# Patient Record
Sex: FEMALE | Race: WHITE | NOT HISPANIC OR LATINO | Employment: UNEMPLOYED
[De-identification: names, ages, dates, MRNs, and addresses within clinical notes are randomized per-mention and may not be internally consistent; named-entity substitution may affect disease eponyms.]

---

## 2017-05-26 ENCOUNTER — HEALTH MAINTENANCE LETTER (OUTPATIENT)
Age: 57
End: 2017-05-26

## 2018-09-22 ENCOUNTER — HEALTH MAINTENANCE LETTER (OUTPATIENT)
Age: 58
End: 2018-09-22

## 2019-07-28 ENCOUNTER — HOSPITAL ENCOUNTER (INPATIENT)
Facility: CLINIC | Age: 59
LOS: 2 days | Discharge: HOME | End: 2019-07-30
Attending: FAMILY MEDICINE | Admitting: FAMILY MEDICINE
Payer: COMMERCIAL

## 2019-07-28 DIAGNOSIS — E87.6 HYPOPOTASSEMIA: Primary | ICD-10-CM

## 2019-07-28 DIAGNOSIS — I10 ESSENTIAL HYPERTENSION: ICD-10-CM

## 2019-07-28 DIAGNOSIS — M62.81 GENERALIZED MUSCLE WEAKNESS: ICD-10-CM

## 2019-07-28 DIAGNOSIS — E86.0 DEHYDRATION: ICD-10-CM

## 2019-07-28 DIAGNOSIS — F17.210 CIGARETTE SMOKER: ICD-10-CM

## 2019-07-28 DIAGNOSIS — F17.200 TOBACCO USE DISORDER: ICD-10-CM

## 2019-07-28 DIAGNOSIS — R50.9 FEVER AND CHILLS: ICD-10-CM

## 2019-07-28 DIAGNOSIS — A41.9 SEPSIS, DUE TO UNSPECIFIED ORGANISM: ICD-10-CM

## 2019-07-28 DIAGNOSIS — N10 ACUTE PYELONEPHRITIS: ICD-10-CM

## 2019-07-28 DIAGNOSIS — G40.909 SEIZURE DISORDER (H): ICD-10-CM

## 2019-07-28 LAB
ALBUMIN SERPL-MCNC: 3.1 G/DL (ref 3.4–5)
ALBUMIN UR-MCNC: NEGATIVE MG/DL
ALP SERPL-CCNC: 47 U/L (ref 40–150)
ALT SERPL W P-5'-P-CCNC: 11 U/L (ref 0–50)
ANION GAP SERPL CALCULATED.3IONS-SCNC: 10 MMOL/L (ref 3–14)
APPEARANCE UR: ABNORMAL
APTT PPP: 34 SEC (ref 22–37)
AST SERPL W P-5'-P-CCNC: 15 U/L (ref 0–45)
BACTERIA #/AREA URNS HPF: ABNORMAL /HPF
BASOPHILS # BLD AUTO: 0 10E9/L (ref 0–0.2)
BASOPHILS NFR BLD AUTO: 0.1 %
BILIRUB SERPL-MCNC: 0.7 MG/DL (ref 0.2–1.3)
BILIRUB UR QL STRIP: NEGATIVE
BUN SERPL-MCNC: 12 MG/DL (ref 7–30)
CALCIUM SERPL-MCNC: 9.1 MG/DL (ref 8.5–10.1)
CHLORIDE SERPL-SCNC: 97 MMOL/L (ref 94–109)
CO2 SERPL-SCNC: 26 MMOL/L (ref 20–32)
COLOR UR AUTO: COLORLESS
CREAT SERPL-MCNC: 0.67 MG/DL (ref 0.52–1.04)
DIFFERENTIAL METHOD BLD: ABNORMAL
EOSINOPHIL NFR BLD AUTO: 0 %
ERYTHROCYTE [DISTWIDTH] IN BLOOD BY AUTOMATED COUNT: 16.5 % (ref 10–15)
GFR SERPL CREATININE-BSD FRML MDRD: >90 ML/MIN/{1.73_M2}
GLUCOSE SERPL-MCNC: 105 MG/DL (ref 70–99)
GLUCOSE UR STRIP-MCNC: NEGATIVE MG/DL
HCT VFR BLD AUTO: 40.4 % (ref 35–47)
HGB BLD-MCNC: 13.6 G/DL (ref 11.7–15.7)
HGB UR QL STRIP: ABNORMAL
IMM GRANULOCYTES # BLD: 0.1 10E9/L (ref 0–0.4)
IMM GRANULOCYTES NFR BLD: 0.4 %
INR PPP: 1.2 (ref 0.86–1.14)
KETONES UR STRIP-MCNC: NEGATIVE MG/DL
LACTATE BLD-SCNC: 1.6 MMOL/L (ref 0.7–2)
LEUKOCYTE ESTERASE UR QL STRIP: ABNORMAL
LYMPHOCYTES # BLD AUTO: 0.9 10E9/L (ref 0.8–5.3)
LYMPHOCYTES NFR BLD AUTO: 7.3 %
MCH RBC QN AUTO: 31 PG (ref 26.5–33)
MCHC RBC AUTO-ENTMCNC: 33.7 G/DL (ref 31.5–36.5)
MCV RBC AUTO: 92 FL (ref 78–100)
MONOCYTES # BLD AUTO: 1 10E9/L (ref 0–1.3)
MONOCYTES NFR BLD AUTO: 8.1 %
MUCOUS THREADS #/AREA URNS LPF: PRESENT /LPF
NEUTROPHILS # BLD AUTO: 10.1 10E9/L (ref 1.6–8.3)
NEUTROPHILS NFR BLD AUTO: 84.1 %
NITRATE UR QL: NEGATIVE
NRBC # BLD AUTO: 0 10*3/UL
NRBC BLD AUTO-RTO: 0 /100
PH UR STRIP: 5 PH (ref 5–7)
PLATELET # BLD AUTO: 166 10E9/L (ref 150–450)
POTASSIUM SERPL-SCNC: 3.6 MMOL/L (ref 3.4–5.3)
PROT SERPL-MCNC: 6.7 G/DL (ref 6.8–8.8)
RBC # BLD AUTO: 4.39 10E12/L (ref 3.8–5.2)
RBC #/AREA URNS AUTO: 5 /HPF (ref 0–2)
SODIUM SERPL-SCNC: 133 MMOL/L (ref 133–144)
SOURCE: ABNORMAL
SP GR UR STRIP: 1 (ref 1–1.03)
SQUAMOUS #/AREA URNS AUTO: <1 /HPF (ref 0–1)
UROBILINOGEN UR STRIP-MCNC: 0 MG/DL (ref 0–2)
WBC # BLD AUTO: 12 10E9/L (ref 4–11)
WBC #/AREA URNS AUTO: 125 /HPF (ref 0–5)

## 2019-07-28 PROCEDURE — 96365 THER/PROPH/DIAG IV INF INIT: CPT | Performed by: FAMILY MEDICINE

## 2019-07-28 PROCEDURE — 85730 THROMBOPLASTIN TIME PARTIAL: CPT | Performed by: FAMILY MEDICINE

## 2019-07-28 PROCEDURE — 87081 CULTURE SCREEN ONLY: CPT | Performed by: FAMILY MEDICINE

## 2019-07-28 PROCEDURE — 99221 1ST HOSP IP/OBS SF/LOW 40: CPT | Mod: AI | Performed by: NURSE PRACTITIONER

## 2019-07-28 PROCEDURE — 87186 SC STD MICRODIL/AGAR DIL: CPT | Performed by: FAMILY MEDICINE

## 2019-07-28 PROCEDURE — 99285 EMERGENCY DEPT VISIT HI MDM: CPT | Mod: Z6 | Performed by: FAMILY MEDICINE

## 2019-07-28 PROCEDURE — 87040 BLOOD CULTURE FOR BACTERIA: CPT | Performed by: NURSE PRACTITIONER

## 2019-07-28 PROCEDURE — 25000132 ZZH RX MED GY IP 250 OP 250 PS 637: Performed by: NURSE PRACTITIONER

## 2019-07-28 PROCEDURE — 99207 ZZC DOWN CODE DUE TO INITIAL EXAM: CPT | Performed by: NURSE PRACTITIONER

## 2019-07-28 PROCEDURE — 25800030 ZZH RX IP 258 OP 636: Performed by: NURSE PRACTITIONER

## 2019-07-28 PROCEDURE — 87077 CULTURE AEROBIC IDENTIFY: CPT | Performed by: FAMILY MEDICINE

## 2019-07-28 PROCEDURE — 85610 PROTHROMBIN TIME: CPT | Performed by: FAMILY MEDICINE

## 2019-07-28 PROCEDURE — 87040 BLOOD CULTURE FOR BACTERIA: CPT | Performed by: FAMILY MEDICINE

## 2019-07-28 PROCEDURE — 80053 COMPREHEN METABOLIC PANEL: CPT | Performed by: FAMILY MEDICINE

## 2019-07-28 PROCEDURE — 25000128 H RX IP 250 OP 636: Performed by: FAMILY MEDICINE

## 2019-07-28 PROCEDURE — 12000000 ZZH R&B MED SURG/OB

## 2019-07-28 PROCEDURE — 25800030 ZZH RX IP 258 OP 636: Performed by: FAMILY MEDICINE

## 2019-07-28 PROCEDURE — 87800 DETECT AGNT MULT DNA DIREC: CPT | Performed by: FAMILY MEDICINE

## 2019-07-28 PROCEDURE — 96361 HYDRATE IV INFUSION ADD-ON: CPT | Performed by: FAMILY MEDICINE

## 2019-07-28 PROCEDURE — 85025 COMPLETE CBC W/AUTO DIFF WBC: CPT | Performed by: FAMILY MEDICINE

## 2019-07-28 PROCEDURE — 99285 EMERGENCY DEPT VISIT HI MDM: CPT | Mod: 25 | Performed by: FAMILY MEDICINE

## 2019-07-28 PROCEDURE — 36415 COLL VENOUS BLD VENIPUNCTURE: CPT | Performed by: NURSE PRACTITIONER

## 2019-07-28 PROCEDURE — 81001 URINALYSIS AUTO W/SCOPE: CPT | Performed by: FAMILY MEDICINE

## 2019-07-28 PROCEDURE — 87086 URINE CULTURE/COLONY COUNT: CPT | Performed by: FAMILY MEDICINE

## 2019-07-28 PROCEDURE — 87088 URINE BACTERIA CULTURE: CPT | Performed by: FAMILY MEDICINE

## 2019-07-28 PROCEDURE — 25000132 ZZH RX MED GY IP 250 OP 250 PS 637: Performed by: FAMILY MEDICINE

## 2019-07-28 PROCEDURE — 83605 ASSAY OF LACTIC ACID: CPT | Performed by: FAMILY MEDICINE

## 2019-07-28 RX ORDER — AMOXICILLIN 250 MG
1 CAPSULE ORAL 2 TIMES DAILY PRN
Status: DISCONTINUED | OUTPATIENT
Start: 2019-07-28 | End: 2019-07-30 | Stop reason: HOSPADM

## 2019-07-28 RX ORDER — AMOXICILLIN 250 MG
2 CAPSULE ORAL 2 TIMES DAILY PRN
Status: DISCONTINUED | OUTPATIENT
Start: 2019-07-28 | End: 2019-07-30 | Stop reason: HOSPADM

## 2019-07-28 RX ORDER — LIDOCAINE 40 MG/G
CREAM TOPICAL
Status: DISCONTINUED | OUTPATIENT
Start: 2019-07-28 | End: 2019-07-28

## 2019-07-28 RX ORDER — PROCHLORPERAZINE 25 MG
25 SUPPOSITORY, RECTAL RECTAL EVERY 12 HOURS PRN
Status: DISCONTINUED | OUTPATIENT
Start: 2019-07-28 | End: 2019-07-30 | Stop reason: HOSPADM

## 2019-07-28 RX ORDER — CEFTRIAXONE 1 G/1
1 INJECTION, POWDER, FOR SOLUTION INTRAMUSCULAR; INTRAVENOUS EVERY 24 HOURS
Status: DISCONTINUED | OUTPATIENT
Start: 2019-07-28 | End: 2019-07-30

## 2019-07-28 RX ORDER — NALOXONE HYDROCHLORIDE 0.4 MG/ML
.1-.4 INJECTION, SOLUTION INTRAMUSCULAR; INTRAVENOUS; SUBCUTANEOUS
Status: DISCONTINUED | OUTPATIENT
Start: 2019-07-28 | End: 2019-07-30 | Stop reason: HOSPADM

## 2019-07-28 RX ORDER — ONDANSETRON 2 MG/ML
4 INJECTION INTRAMUSCULAR; INTRAVENOUS EVERY 6 HOURS PRN
Status: DISCONTINUED | OUTPATIENT
Start: 2019-07-28 | End: 2019-07-30 | Stop reason: HOSPADM

## 2019-07-28 RX ORDER — ACETAMINOPHEN 325 MG/1
650 TABLET ORAL ONCE
Status: COMPLETED | OUTPATIENT
Start: 2019-07-28 | End: 2019-07-28

## 2019-07-28 RX ORDER — LIDOCAINE 40 MG/G
CREAM TOPICAL
Status: DISCONTINUED | OUTPATIENT
Start: 2019-07-28 | End: 2019-07-30 | Stop reason: HOSPADM

## 2019-07-28 RX ORDER — SODIUM CHLORIDE 9 MG/ML
INJECTION, SOLUTION INTRAVENOUS CONTINUOUS
Status: DISCONTINUED | OUTPATIENT
Start: 2019-07-28 | End: 2019-07-30

## 2019-07-28 RX ORDER — PROCHLORPERAZINE MALEATE 5 MG
10 TABLET ORAL EVERY 6 HOURS PRN
Status: DISCONTINUED | OUTPATIENT
Start: 2019-07-28 | End: 2019-07-30 | Stop reason: HOSPADM

## 2019-07-28 RX ORDER — ACETAMINOPHEN 325 MG/1
650 TABLET ORAL EVERY 4 HOURS PRN
Status: DISCONTINUED | OUTPATIENT
Start: 2019-07-28 | End: 2019-07-30 | Stop reason: HOSPADM

## 2019-07-28 RX ORDER — HYDROMORPHONE HYDROCHLORIDE 1 MG/ML
.2-.3 INJECTION, SOLUTION INTRAMUSCULAR; INTRAVENOUS; SUBCUTANEOUS
Status: DISCONTINUED | OUTPATIENT
Start: 2019-07-28 | End: 2019-07-30 | Stop reason: HOSPADM

## 2019-07-28 RX ORDER — HYDROCODONE BITARTRATE AND ACETAMINOPHEN 5; 325 MG/1; MG/1
1-2 TABLET ORAL EVERY 4 HOURS PRN
Status: DISCONTINUED | OUTPATIENT
Start: 2019-07-28 | End: 2019-07-30 | Stop reason: HOSPADM

## 2019-07-28 RX ORDER — DIVALPROEX SODIUM 250 MG/1
500 TABLET, EXTENDED RELEASE ORAL 2 TIMES DAILY
Status: DISCONTINUED | OUTPATIENT
Start: 2019-07-28 | End: 2019-07-30 | Stop reason: HOSPADM

## 2019-07-28 RX ORDER — ONDANSETRON 4 MG/1
4 TABLET, ORALLY DISINTEGRATING ORAL EVERY 6 HOURS PRN
Status: DISCONTINUED | OUTPATIENT
Start: 2019-07-28 | End: 2019-07-30 | Stop reason: HOSPADM

## 2019-07-28 RX ADMIN — ACETAMINOPHEN 650 MG: 325 TABLET ORAL at 13:55

## 2019-07-28 RX ADMIN — SODIUM CHLORIDE: 9 INJECTION, SOLUTION INTRAVENOUS at 23:07

## 2019-07-28 RX ADMIN — CEFTRIAXONE SODIUM 1 G: 1 INJECTION, POWDER, FOR SOLUTION INTRAMUSCULAR; INTRAVENOUS at 14:45

## 2019-07-28 RX ADMIN — DIVALPROEX SODIUM 500 MG: 250 TABLET, FILM COATED, EXTENDED RELEASE ORAL at 21:10

## 2019-07-28 RX ADMIN — HYDROCODONE BITARTRATE AND ACETAMINOPHEN 2 TABLET: 5; 325 TABLET ORAL at 19:23

## 2019-07-28 RX ADMIN — SODIUM CHLORIDE 1000 ML: 9 INJECTION, SOLUTION INTRAVENOUS at 12:58

## 2019-07-28 RX ADMIN — SODIUM CHLORIDE: 9 INJECTION, SOLUTION INTRAVENOUS at 14:46

## 2019-07-28 ASSESSMENT — ENCOUNTER SYMPTOMS
NAUSEA: 1
FEVER: 1
ABDOMINAL PAIN: 0
COUGH: 0
CHEST TIGHTNESS: 0
POLYDIPSIA: 0
BACK PAIN: 1
HEADACHES: 0
WEAKNESS: 1
LIGHT-HEADEDNESS: 1
PALPITATIONS: 0
DIARRHEA: 0
SORE THROAT: 0
EYE REDNESS: 0
FATIGUE: 1
SHORTNESS OF BREATH: 0
CHILLS: 1
DIZZINESS: 1
VOMITING: 0
CONFUSION: 0
NERVOUS/ANXIOUS: 0
FREQUENCY: 1
DYSURIA: 1

## 2019-07-28 ASSESSMENT — ACTIVITIES OF DAILY LIVING (ADL)
NUMBER_OF_TIMES_PATIENT_HAS_FALLEN_WITHIN_LAST_SIX_MONTHS: 1
SWALLOWING: 2-->DIFFICULTY SWALLOWING LIQUIDS
BATHING: 1-->ASSISTIVE EQUIPMENT
TOILETING: 1-->ASSISTIVE EQUIPMENT
FALL_HISTORY_WITHIN_LAST_SIX_MONTHS: YES
WHICH_OF_THE_ABOVE_FUNCTIONAL_RISKS_HAD_A_RECENT_ONSET_OR_CHANGE?: AMBULATION
ADLS_ACUITY_SCORE: 22
TRANSFERRING: 0-->INDEPENDENT
AMBULATION: 0-->INDEPENDENT
RETIRED_EATING: 1-->ASSISTIVE EQUIPMENT
COGNITION: 0 - NO COGNITION ISSUES REPORTED
DRESS: 1-->ASSISTIVE EQUIPMENT
RETIRED_COMMUNICATION: 2-->DIFFICULTY UNDERSTANDING (NOT RELATED TO LANGUAGE BARRIER)

## 2019-07-28 ASSESSMENT — MIFFLIN-ST. JEOR: SCORE: 957.75

## 2019-07-28 NOTE — ED NOTES
Straight cath'd for small amt of urine after incont.  Pt cont to chill.  Temp re-ck'd  Elevated 102.2 oral.  MD notified.

## 2019-07-28 NOTE — ED PROVIDER NOTES
History     Chief Complaint   Patient presents with     Flank Pain     HPI  Jemima Yuen is a 58 year old female with history of seizure disorder, tobacco abuse and hypertension who presents to the emergency room with severe symptoms of illness of fever chills weakness and back pain.  Patient states she is only here at the insistence of her sister who forced her to come in.  Patient does not like to see doctors or seek medical care.  She has had frequency and dysuria for 2 to 3 weeks.  Symptoms have been increasing.  The last several days she has been complaining of flank pain.  She has had nausea without vomiting.  She denies headache or sore throat or any cold symptoms.  No chest pain cough or congestion.  No abdominal pain vomiting or diarrhea.  No rash.  No history of tick bites.  Has a history of UTIs but is never had a kidney infection or sepsis.  Today she was feeling very feverish and weak.    Allergies:  Allergies   Allergen Reactions     Codeine Itching     Percocet [Oxycodone-Acetaminophen] GI Disturbance     Vicodin [Hydrocodone-Acetaminophen] GI Disturbance       Problem List:    Patient Active Problem List    Diagnosis Date Noted     Mammogram declined 12/03/2014     Priority: Medium     Pap smear of cervix declined 12/03/2014     Priority: Medium     Tobacco use disorder 12/03/2014     Priority: Medium     Ovarian cyst 03/15/2011     Priority: Medium     Convulsions (H)      Priority: Medium     Epilepsy  Problem list name updated by automated process. Provider to review       CARDIOVASCULAR SCREENING; LDL GOAL LESS THAN 160 10/31/2010     Priority: Medium     Hypertension 03/17/2009     Priority: Medium        Past Medical History:    Past Medical History:   Diagnosis Date     Excessive or frequent menstruation 2/01     Headache(784.0)      Hypertension 3/17/2009     Other convulsions      Other unspecified back disorder        Past Surgical History:    Past Surgical History:   Procedure  Laterality Date     C LIGATE FALLOPIAN TUBE,POSTPARTUM  1981    Tubal Ligation     COLONOSCOPY  2011    Procedure:COMBINED COLONOSCOPY, SINGLE BIOPSY/POLYPECTOMY BY BIOPSY; Surgeon:PARRISH PINON; Location:[facility]     HC KNEE SCOPE, DIAGNOSTIC  10/95,     Arthroscopy,  Right Knee x 2     HC REPAIR INTERCARP/CARP-METACARP JT  08    left wrist       Family History:    Family History   Problem Relation Age of Onset     Neurologic Disorder Mother         epilepsy       Social History:  Marital Status:   [2]  Social History     Tobacco Use     Smoking status: Current Every Day Smoker     Packs/day: 0.25     Last attempt to quit: 2011     Years since quittin.4     Smokeless tobacco: Never Used     Tobacco comment: no smokers in household   Substance Use Topics     Alcohol use: Yes     Comment: dennis x 1/<1x per yr     Drug use: No     Comment: Marijuana in past/nothing since         Medications:      cyclobenzaprine (FLEXERIL) 10 MG tablet   DEPAKOTE  MG OR TB24   MULTIPLE VITAMIN OR TABS         Review of Systems   Constitutional: Positive for chills, fatigue and fever.   HENT: Negative for congestion and sore throat.    Eyes: Negative for redness.   Respiratory: Negative for cough, chest tightness and shortness of breath.    Cardiovascular: Negative for chest pain and palpitations.   Gastrointestinal: Positive for nausea. Negative for abdominal pain, diarrhea and vomiting.   Endocrine: Negative for polydipsia.   Genitourinary: Positive for dysuria, frequency and urgency.   Musculoskeletal: Positive for back pain.   Skin: Negative for rash.   Allergic/Immunologic: Negative for immunocompromised state.   Neurological: Positive for dizziness, weakness and light-headedness. Negative for headaches.   Psychiatric/Behavioral: Negative for confusion. The patient is not nervous/anxious.    All other systems reviewed and are negative.      Physical Exam   BP: 154/89  Pulse: 124  Heart  Rate: 141  Temp: 98.1  F (36.7  C)  Resp: 28  SpO2: 95 %      Physical Exam   Constitutional: She is oriented to person, place, and time. She appears well-developed and well-nourished.   Thin weak frail 58-year-old female who appears older than stated age.  She appears ill-febrile tachycardic.  She is breathing at ease and has a normal O2 sat on room air./90   Pulse 124   Temp 102.8  F (39.3  C) (Oral)   Resp (!) 35   LMP 12/07/2010   SpO2 94%      HENT:   Head: Normocephalic and atraumatic.   Right Ear: External ear normal.   Left Ear: External ear normal.   Nose: Nose normal.   Mouth/Throat: Oropharynx is clear and moist.   Eyes: Pupils are equal, round, and reactive to light. Conjunctivae and EOM are normal.   Neck: Normal range of motion. Neck supple.   Cardiovascular: Normal rate, regular rhythm, normal heart sounds and intact distal pulses.   Pulmonary/Chest: Effort normal and breath sounds normal.   Abdominal: Soft. Bowel sounds are normal.   Musculoskeletal: Normal range of motion.   Neurological: She is alert and oriented to person, place, and time.   Skin: Skin is warm and dry. Capillary refill takes less than 2 seconds.   Psychiatric: She has a normal mood and affect. Her behavior is normal.   Nursing note and vitals reviewed.      ED Course        Procedures               Critical Care time:  none               Results for orders placed or performed during the hospital encounter of 07/28/19 (from the past 24 hour(s))   Comprehensive metabolic panel   Result Value Ref Range    Sodium 133 133 - 144 mmol/L    Potassium 3.6 3.4 - 5.3 mmol/L    Chloride 97 94 - 109 mmol/L    Carbon Dioxide 26 20 - 32 mmol/L    Anion Gap 10 3 - 14 mmol/L    Glucose 105 (H) 70 - 99 mg/dL    Urea Nitrogen 12 7 - 30 mg/dL    Creatinine 0.67 0.52 - 1.04 mg/dL    GFR Estimate >90 >60 mL/min/[1.73_m2]    GFR Estimate If Black >90 >60 mL/min/[1.73_m2]    Calcium 9.1 8.5 - 10.1 mg/dL    Bilirubin Total 0.7 0.2 - 1.3 mg/dL     Albumin 3.1 (L) 3.4 - 5.0 g/dL    Protein Total 6.7 (L) 6.8 - 8.8 g/dL    Alkaline Phosphatase 47 40 - 150 U/L    ALT 11 0 - 50 U/L    AST 15 0 - 45 U/L   CBC with platelets differential   Result Value Ref Range    WBC 12.0 (H) 4.0 - 11.0 10e9/L    RBC Count 4.39 3.8 - 5.2 10e12/L    Hemoglobin 13.6 11.7 - 15.7 g/dL    Hematocrit 40.4 35.0 - 47.0 %    MCV 92 78 - 100 fl    MCH 31.0 26.5 - 33.0 pg    MCHC 33.7 31.5 - 36.5 g/dL    RDW 16.5 (H) 10.0 - 15.0 %    Platelet Count 166 150 - 450 10e9/L    Diff Method Automated Method     % Neutrophils 84.1 %    % Lymphocytes 7.3 %    % Monocytes 8.1 %    % Eosinophils 0.0 %    % Basophils 0.1 %    % Immature Granulocytes 0.4 %    Nucleated RBCs 0 0 /100    Absolute Neutrophil 10.1 (H) 1.6 - 8.3 10e9/L    Absolute Lymphocytes 0.9 0.8 - 5.3 10e9/L    Absolute Monocytes 1.0 0.0 - 1.3 10e9/L    Absolute Basophils 0.0 0.0 - 0.2 10e9/L    Abs Immature Granulocytes 0.1 0 - 0.4 10e9/L    Absolute Nucleated RBC 0.0    Lactate for Sepsis Protocol   Result Value Ref Range    Lactate for Sepsis Protocol 1.6 0.7 - 2.0 mmol/L   UA with Microscopic   Result Value Ref Range    Color Urine Colorless     Appearance Urine Slightly Cloudy     Glucose Urine Negative NEG^Negative mg/dL    Bilirubin Urine Negative NEG^Negative    Ketones Urine Negative NEG^Negative mg/dL    Specific Gravity Urine 1.005 1.003 - 1.035    Blood Urine Moderate (A) NEG^Negative    pH Urine 5.0 5.0 - 7.0 pH    Protein Albumin Urine Negative NEG^Negative mg/dL    Urobilinogen mg/dL 0.0 0.0 - 2.0 mg/dL    Nitrite Urine Negative NEG^Negative    Leukocyte Esterase Urine Large (A) NEG^Negative    Source Catheterized Urine     WBC Urine 125 (H) 0 - 5 /HPF    RBC Urine 5 (H) 0 - 2 /HPF    Bacteria Urine Moderate (A) NEG^Negative /HPF    Squamous Epithelial /HPF Urine <1 0 - 1 /HPF    Mucous Urine Present (A) NEG^Negative /LPF   Blood culture   Result Value Ref Range    Specimen Description Blood     Culture Micro No growth  after 1 hour    Blood culture   Result Value Ref Range    Specimen Description Blood     Culture Micro No growth after 1 hour    INR   Result Value Ref Range    INR 1.20 (H) 0.86 - 1.14   Partial thromboplastin time   Result Value Ref Range    PTT 34 22 - 37 sec       Medications   lidocaine 1 % 1 mL (has no administration in time range)   lidocaine (LMX4) kit (has no administration in time range)   sodium chloride (PF) 0.9% PF flush 3 mL (has no administration in time range)   sodium chloride (PF) 0.9% PF flush 3 mL (has no administration in time range)   lidocaine 1 % 0.1-1 mL (has no administration in time range)   lidocaine (LMX4) kit (has no administration in time range)   sodium chloride (PF) 0.9% PF flush 3 mL (has no administration in time range)   sodium chloride (PF) 0.9% PF flush 3 mL (has no administration in time range)   0.9% sodium chloride BOLUS (has no administration in time range)   sodium chloride 0.9% infusion ( Intravenous New Bag 7/28/19 1446)   cefTRIAXone (ROCEPHIN) 1 g vial to attach to  mL bag for ADULTS or NS 50 mL bag for PEDS (1 g Intravenous New Bag 7/28/19 1445)   0.9% sodium chloride BOLUS (0 mLs Intravenous Stopped 7/28/19 1446)   acetaminophen (TYLENOL) tablet 650 mg (650 mg Oral Given 7/28/19 1355)           3:24 PM--discussed with Dr. Mary Nguyễn hospitalist who accepts care of the patient and admission.      Assessments & Plan (with Medical Decision Making)   MDM--58-year-old female who presents to the emergency room with 1 to 2 weeks of urinary symptoms of frequency and dysuria.  She has now become feverish and complains of back pain consistent with a pyelonephritis.  She has an elevated temp, elevated white count, tachycardia.  She appears somewhat dehydrated.  She is normotensive during her ED stay.  She was treated for possible sepsis with aggressive IV fluids at 30 mils per kilo along with Rocephin 1 g IV for suspected pyelonephritis- urinary source.  Urine is a  cathed specimen is been sent for culture and shows a definite urinary tract infection.  No other source for the infection is identified.  The patient appears very frail and ill and I recommended hospitalization until she is showing improvement as she has ongoing risk for sepsis, hypotension and death.  Patient is agreeable to be admitted to the hospital.  This is all discussed history and physical and everything with the patient's sister in the room and the patient is agreeable to this.  Patient tends to minimize her history.  Patient is a very reluctant to seek medical care.  She has ongoing tobacco abuse but no longer drinks.  Sister alludes that she used to drink more in the past.  Patient does not appear to be under the influence of any alcohol or drugs.        I have reviewed the nursing notes.    I have reviewed the findings, diagnosis, plan and need for follow up with the patient.             Medication List      There are no discharge medications for this visit.         Final diagnoses:   Sepsis, due to unspecified organism (H)   Acute pyelonephritis   Dehydration   Fever and chills   Generalized muscle weakness   Essential hypertension   Tobacco use disorder       7/28/2019   [facility] EMERGENCY DEPARTMENT     AungDajuan MD  07/28/19 1524       Aung, Dajuan JOHNSTON MD  07/28/19 1524

## 2019-07-28 NOTE — PROGRESS NOTES
S-(situation): Patient arrives to room 271 via cart from ER    B-(background): Generalized weakness / UTI    A-(assessment): BP and HR controlled T 97.0, RA. Patient is incontinent no c/o pain and or n/v. Patient states too weak to stand up. AOX4 incontinent of urine. Med rec reviewed with patient    R-(recommendations): Orders reviewed with patient and sister. Will monitor patient  per MD orders.     Inpatient nursing criteria listed below were met:    Health care directives status obtained and documented: Yes  SCD's Documented: Yes  Skin issues/needs documented:Yes  Isolation needs addressed, if appropriate: NA  Fall Prevention: Care plan updated, Education given and documented Yes  MRSA swab completed for patient 55 years and older (exclude HARESH and TKA): Yes  Care Plan initiated: Yes  Education Assessment documented:Yes  Education Documented (Pre-existing chronic infection such as, MRSA/VRE need education on admission): NA  Admission Medication Reconciliation completed: Yes  New medication patient education completed and documented (Possible Side Effects of Common Medications handout): Yes  Home medications if not able to send immediately home with family stored here: NA  Reminder note placed in discharge instructions: Yes  Discharge planning review completed (admission navigator) Yes

## 2019-07-28 NOTE — ED NOTES
Pt states that she has not been feeling well x 2 wks.  She has had urinary frequency/urgency/dysuria and LLQ pain.  No hx of uti/sepsis.  Pt is febrile, chilled, febrile, tachycardic, and malaise.  Pt here with her sister.  Sepsis alert fired and IV/labs done.

## 2019-07-28 NOTE — H&P
[facility]    History and Physical - Hospitalist Service       Date of Admission:  7/28/2019    Assessment & Plan   Jemima Yuen is a 58 year old female with history of seizure disorder, tobacco abuse and hypertension who presented to the emergency room 7/28/19 with fever, chills, weakness, and left flank pain.  Patient has had frequency and dysuria for 2 to 3 weeks and symptoms have been progressively worsening. She notes onset of left flank pain over the last several days. Noted ongoing nausea but denied vomiting.  Has a history of UTIs but is never had a kidney infection or sepsis. Upon arrival, patient noted to have a fever of 102.2. Patient was also noted to be tachycardic and had an elevated WBC count of 12 and a lactate of 1.6. UA was collected and appeared to be indicative of UTI. Patient treated for possible sepsis in the ED with IV fluids and IV Rocephin for suspected pyelonephritis. Blood and urine cultures collected and results are pending. Patient is at high risk for an adverse outcome including worsening weakness, inadequate oral intake, worsening functional status, and even death, so hospitalization is considered medically necessary.  Based on the presently available information, hospitalization for at least 2 midnights is anticipated.    Principal Problem:    Pyelonephritis, acute  Assessment: Patient presented with a several week history of urinary frequency and dysuria with fever, chills, weakness and left flank pain. Has a history of UTIs but is never had a kidney infection or sepsis. Upon arrival, patient noted to have a fever of 102.2. Patient was also noted to be tachycardic and had an elevated WBC count of 12 and a lactate of 1.6. UA was collected and appeared to be indicative of UTI. Patient treated for possible sepsis in the ED with IV fluids and IV Rocephin for suspected pyelonephritis. Patient notes ongoing, intermittent pain in left flank which she notes  "is \"absolutely terrible\" when it is at it's worst. Denies shortness of breath and patient is maintaining oxygen saturations above 90% on room air. Currently denies nausea but notes her appetite is minimal. Patient is tachycardic with heart rates in the 120's and has a fever up to 102.8.   Plan: Will admit patient to inpatient status and continue IV fluids at 150 mL/hr. Patient is likely dehydrated and heart rate may improve with fluid resuscitation. Will continue IV Rocephin and await cultures. Will order renal ultrasound to evaluate for nephrolithiasis. IV dilaudid and Norco available as needed for pain. IV antiemetics available for nausea. Will continue to monitor vital signs closely.    Active Problems:    Sepsis (H)  Assessment: Patient presented with symptoms concerning for sepsis due to pyelonephritis. Found to have fever, leukocytosis, and tachycardia. Lactic acid normal at 1.6.   Plan: Treatment of infection as above. Will await blood and urine culture results. Continue to monitor vital signs closely.     Seizure disorder (H)  Assessment: Chronic and managed with Depakote  mg twice daily   Plan: Home dose of Depakote ordered while patient in hospital.      Diet: Regular  DVT Prophylaxis: Pneumatic Compression Devices  Gregg Catheter: not present  Code Status: FULL    Disposition Plan   Expected discharge: 2 - 3 days, recommended to prior living arrangement once adequate pain management/ tolerating PO medications, antibiotic plan established and SIRS/Sepsis treated.  Entered: Raymond Cowart NP 07/28/2019, 4:06 PM     The patient's care was discussed with the Attending Physician, Dr. Nguyễn, Patient and Patient's Family.    Raymond Cowart NP  [facility]    ______________________________________________________________________    Chief Complaint   Fever, chills, left flank pain    History is obtained from the patient    History of Present Illness   Jemima Yuen is a 58 " "year old female with history of seizure disorder, tobacco abuse and hypertension who presented to the emergency room 7/28/19 with fever, chills, weakness, and left flank pain.  Patient has had frequency and dysuria for 2 to 3 weeks and symptoms have been progressively worsening. She notes onset of left flank pain over the last several days. Noted ongoing nausea but denied vomiting.  Has a history of UTIs but is never had a kidney infection or sepsis. Upon arrival, patient noted to have a fever of 102.2. Patient was also noted to be tachycardic and had an elevated WBC count of 12 and a lactate of 1.6. UA was collected and appeared to be indicative of UTI. Patient treated for possible sepsis in the ED with IV fluids and IV Rocephin for suspected pyelonephritis. Blood and urine cultures collected and results are pending. Patient notes ongoing, intermittent pain in left flank which she notes is \"absolutely terrible\" when it is at it's worst. Denies shortness of breath and patient is maintaining oxygen saturations above 90% on room air. Currently denies nausea but notes her appetite is minimal. Patient is tachycardic with heart rates in the 120's and has a fever up to 102.8.       Review of Systems    The 10 point Review of Systems is negative other than noted in the HPI or here.     Past Medical History    I have reviewed this patient's medical history and updated it with pertinent information if needed.   Past Medical History:   Diagnosis Date     Excessive or frequent menstruation 2/01     Headache(784.0)      Hypertension 3/17/2009     Other convulsions     Epilepsy     Other unspecified back disorder     ?sacroiliitis, ?sciatica, chronic intermittent symptoms       Past Surgical History   I have reviewed this patient's surgical history and updated it with pertinent information if needed.  Past Surgical History:   Procedure Laterality Date     C LIGATE FALLOPIAN TUBE,POSTPARTUM  1981    Tubal Ligation     COLONOSCOPY "  2011    Procedure:COMBINED COLONOSCOPY, SINGLE BIOPSY/POLYPECTOMY BY BIOPSY; Surgeon:PARRISH PINON; Location:[facility]     HC KNEE SCOPE, DIAGNOSTIC  10/95,     Arthroscopy,  Right Knee x 2     HC REPAIR INTERCARP/CARP-METACARP JT  08    left wrist       Social History   I have reviewed this patient's social history and updated it with pertinent information if needed.  Social History     Tobacco Use     Smoking status: Current Every Day Smoker     Packs/day: 0.25     Last attempt to quit: 2011     Years since quittin.4     Smokeless tobacco: Never Used     Tobacco comment: no smokers in household   Substance Use Topics     Alcohol use: Yes     Comment: dennis x 1/<1x per yr     Drug use: No     Comment: Marijuana in past/nothing since        Family History   I have reviewed this patient's family history and updated it with pertinent information if needed.   Family History   Problem Relation Age of Onset     Neurologic Disorder Mother         epilepsy       Prior to Admission Medications   Prior to Admission Medications   Prescriptions Last Dose Informant Patient Reported? Taking?   DEPAKOTE  MG OR  at am  Yes Yes   Si TABLET s in the am and 2 in the PM DAILY   MULTIPLE VITAMIN OR TABS 2019 at am  Yes Yes   cyclobenzaprine (FLEXERIL) 10 MG tablet   No No   Sig: Take 0.5-1 tablets (5-10 mg) by mouth 3 times daily as needed for muscle spasms      Facility-Administered Medications: None     Allergies   Allergies   Allergen Reactions     Codeine Itching     Percocet [Oxycodone-Acetaminophen] GI Disturbance     Vicodin [Hydrocodone-Acetaminophen] GI Disturbance       Physical Exam   Vital Signs: Temp: 102.8  F (39.3  C) Temp src: Oral BP: 142/83 Pulse: 121 Heart Rate: 123 Resp: 16 SpO2: 95 % O2 Device: None (Room air)    Weight: 0 lbs 0 oz    Constitutional: awake, alert, cooperative, no apparent distress, and appears stated age  Respiratory: No increased work  of breathing, good air exchange, clear to auscultation bilaterally, no crackles or wheezing  Cardiovascular: regular rate and rhythm, normal S1 and S2 and no murmur noted  GI: normal bowel sounds, non-distended and non-tender  Skin: no bruising or bleeding and normal skin color, texture, turgor  Musculoskeletal: no lower extremity pitting edema present  there is no redness, warmth, or swelling of the joints  full range of motion noted  Neurologic: Awake, alert, oriented to name, place and time.     Data   Data reviewed today: I reviewed all medications, new labs and imaging results over the last 24 hours.    Recent Labs   Lab 07/28/19  1256   WBC 12.0*   HGB 13.6   MCV 92      INR 1.20*      POTASSIUM 3.6   CHLORIDE 97   CO2 26   BUN 12   CR 0.67   ANIONGAP 10   MIMA 9.1   *   ALBUMIN 3.1*   PROTTOTAL 6.7*   BILITOTAL 0.7   ALKPHOS 47   ALT 11   AST 15     No results found for this or any previous visit (from the past 24 hour(s)).

## 2019-07-29 ENCOUNTER — APPOINTMENT (OUTPATIENT)
Dept: ULTRASOUND IMAGING | Facility: CLINIC | Age: 59
End: 2019-07-29
Attending: NURSE PRACTITIONER
Payer: COMMERCIAL

## 2019-07-29 LAB
ANION GAP SERPL CALCULATED.3IONS-SCNC: 10 MMOL/L (ref 3–14)
BACTERIA SPEC CULT: ABNORMAL
BACTERIA SPEC CULT: NORMAL
BUN SERPL-MCNC: 10 MG/DL (ref 7–30)
CALCIUM SERPL-MCNC: 7.5 MG/DL (ref 8.5–10.1)
CHLORIDE SERPL-SCNC: 106 MMOL/L (ref 94–109)
CO2 SERPL-SCNC: 21 MMOL/L (ref 20–32)
CREAT SERPL-MCNC: 0.57 MG/DL (ref 0.52–1.04)
ERYTHROCYTE [DISTWIDTH] IN BLOOD BY AUTOMATED COUNT: 16.8 % (ref 10–15)
GFR SERPL CREATININE-BSD FRML MDRD: >90 ML/MIN/{1.73_M2}
GLUCOSE SERPL-MCNC: 61 MG/DL (ref 70–99)
HCT VFR BLD AUTO: 32.1 % (ref 35–47)
HGB BLD-MCNC: 10.8 G/DL (ref 11.7–15.7)
LACTATE BLD-SCNC: 0.4 MMOL/L (ref 0.7–2)
MCH RBC QN AUTO: 31.2 PG (ref 26.5–33)
MCHC RBC AUTO-ENTMCNC: 33.6 G/DL (ref 31.5–36.5)
MCV RBC AUTO: 93 FL (ref 78–100)
PLATELET # BLD AUTO: 100 10E9/L (ref 150–450)
POTASSIUM SERPL-SCNC: 3.2 MMOL/L (ref 3.4–5.3)
POTASSIUM SERPL-SCNC: 3.9 MMOL/L (ref 3.4–5.3)
RBC # BLD AUTO: 3.46 10E12/L (ref 3.8–5.2)
SODIUM SERPL-SCNC: 137 MMOL/L (ref 133–144)
SPECIMEN SOURCE: ABNORMAL
SPECIMEN SOURCE: NORMAL
WBC # BLD AUTO: 5.9 10E9/L (ref 4–11)

## 2019-07-29 PROCEDURE — 36415 COLL VENOUS BLD VENIPUNCTURE: CPT | Performed by: PEDIATRICS

## 2019-07-29 PROCEDURE — 25000128 H RX IP 250 OP 636: Performed by: NURSE PRACTITIONER

## 2019-07-29 PROCEDURE — 80048 BASIC METABOLIC PNL TOTAL CA: CPT | Performed by: NURSE PRACTITIONER

## 2019-07-29 PROCEDURE — 25800030 ZZH RX IP 258 OP 636: Performed by: NURSE PRACTITIONER

## 2019-07-29 PROCEDURE — 76770 US EXAM ABDO BACK WALL COMP: CPT

## 2019-07-29 PROCEDURE — 12000000 ZZH R&B MED SURG/OB

## 2019-07-29 PROCEDURE — 25000132 ZZH RX MED GY IP 250 OP 250 PS 637: Performed by: NURSE PRACTITIONER

## 2019-07-29 PROCEDURE — 99233 SBSQ HOSP IP/OBS HIGH 50: CPT | Performed by: NURSE PRACTITIONER

## 2019-07-29 PROCEDURE — 83605 ASSAY OF LACTIC ACID: CPT | Performed by: PEDIATRICS

## 2019-07-29 PROCEDURE — 36415 COLL VENOUS BLD VENIPUNCTURE: CPT | Performed by: FAMILY MEDICINE

## 2019-07-29 PROCEDURE — 84132 ASSAY OF SERUM POTASSIUM: CPT | Performed by: FAMILY MEDICINE

## 2019-07-29 PROCEDURE — 36415 COLL VENOUS BLD VENIPUNCTURE: CPT | Performed by: NURSE PRACTITIONER

## 2019-07-29 PROCEDURE — 85027 COMPLETE CBC AUTOMATED: CPT | Performed by: NURSE PRACTITIONER

## 2019-07-29 RX ORDER — POTASSIUM CHLORIDE 7.45 MG/ML
10 INJECTION INTRAVENOUS
Status: DISCONTINUED | OUTPATIENT
Start: 2019-07-29 | End: 2019-07-30 | Stop reason: HOSPADM

## 2019-07-29 RX ORDER — POTASSIUM CHLORIDE 1.5 G/1.58G
20-40 POWDER, FOR SOLUTION ORAL
Status: DISCONTINUED | OUTPATIENT
Start: 2019-07-29 | End: 2019-07-30 | Stop reason: HOSPADM

## 2019-07-29 RX ORDER — POTASSIUM CHLORIDE 20MEQ/15ML
40 LIQUID (ML) ORAL ONCE
Status: DISCONTINUED | OUTPATIENT
Start: 2019-07-29 | End: 2019-07-29

## 2019-07-29 RX ORDER — POTASSIUM CHLORIDE 1500 MG/1
20-40 TABLET, EXTENDED RELEASE ORAL
Status: DISCONTINUED | OUTPATIENT
Start: 2019-07-29 | End: 2019-07-30 | Stop reason: HOSPADM

## 2019-07-29 RX ORDER — POTASSIUM CL/LIDO/0.9 % NACL 10MEQ/0.1L
10 INTRAVENOUS SOLUTION, PIGGYBACK (ML) INTRAVENOUS
Status: DISCONTINUED | OUTPATIENT
Start: 2019-07-29 | End: 2019-07-30 | Stop reason: HOSPADM

## 2019-07-29 RX ORDER — POTASSIUM CHLORIDE 29.8 MG/ML
20 INJECTION INTRAVENOUS
Status: DISCONTINUED | OUTPATIENT
Start: 2019-07-29 | End: 2019-07-30 | Stop reason: HOSPADM

## 2019-07-29 RX ADMIN — HYDROCODONE BITARTRATE AND ACETAMINOPHEN 1 TABLET: 5; 325 TABLET ORAL at 12:42

## 2019-07-29 RX ADMIN — CEFTRIAXONE SODIUM 1 G: 1 INJECTION, POWDER, FOR SOLUTION INTRAMUSCULAR; INTRAVENOUS at 14:53

## 2019-07-29 RX ADMIN — SODIUM CHLORIDE: 9 INJECTION, SOLUTION INTRAVENOUS at 05:11

## 2019-07-29 RX ADMIN — SODIUM CHLORIDE: 9 INJECTION, SOLUTION INTRAVENOUS at 22:57

## 2019-07-29 RX ADMIN — DIVALPROEX SODIUM 500 MG: 250 TABLET, FILM COATED, EXTENDED RELEASE ORAL at 08:33

## 2019-07-29 RX ADMIN — POTASSIUM CHLORIDE 20 MEQ: 1500 TABLET, EXTENDED RELEASE ORAL at 11:43

## 2019-07-29 RX ADMIN — POTASSIUM CHLORIDE 40 MEQ: 1500 TABLET, EXTENDED RELEASE ORAL at 08:33

## 2019-07-29 RX ADMIN — DIVALPROEX SODIUM 500 MG: 250 TABLET, FILM COATED, EXTENDED RELEASE ORAL at 20:28

## 2019-07-29 RX ADMIN — SODIUM CHLORIDE: 9 INJECTION, SOLUTION INTRAVENOUS at 11:44

## 2019-07-29 RX ADMIN — HYDROCODONE BITARTRATE AND ACETAMINOPHEN 2 TABLET: 5; 325 TABLET ORAL at 22:57

## 2019-07-29 ASSESSMENT — ACTIVITIES OF DAILY LIVING (ADL)
ADLS_ACUITY_SCORE: 22

## 2019-07-29 NOTE — PLAN OF CARE
Patient was initially fairly lethargic this evening with a temp of 100.2. Norco given for ongoing left flank pain. Recheck of temp was 99.5 and pt was much more alert and up watching tv. She declined anything for nausea but reported having nausea on and off throughout the day. Pt reports her incontinence is new since this infection and that she is not typically incontinent. Pt ambulated well into the bathroom tonight with stand by assist.

## 2019-07-29 NOTE — PROGRESS NOTES
SPIRITUAL HEALTH SERVICES  SPIRITUAL ASSESSMENT Progress Note  [facility]      During Rounding,  introduced himself to Angela Alpa and informed her of his availability.    Cal Lal M.Div., [facility]  Staff   Office tel: [phone]

## 2019-07-29 NOTE — PLAN OF CARE
S-(situation): shift note    B-(background): admit yesterday with pyelonephritis abdominal pain, weakness    A-(assessment): continues with low grade temp today, peak 100.2 at 8 am and 100.0 at noon. Little to no appetite, taking adequate amounts of fluids. Able to walk to and from bathroom steadily while holding to IV stand. Replacement potassium given per protocol. Medicated x1 for c/o head-ache, abdominal discomfort manageable.     R-(recommendations): continue to monitor closely. Kareen PUGA     1615: Flagged for Lactic Acid, orders written.

## 2019-07-29 NOTE — PROGRESS NOTES
VSS, afebrile.  Ambulated to BRP's with SBA, moves slowly.  Denies pain and nausea.  IVF continue.

## 2019-07-29 NOTE — PROVIDER NOTIFICATION
Dr. Ramirez informed of critical lab:  Positive blood culture R arm from 7/28 at 1256, gm negative rods.

## 2019-07-29 NOTE — PROGRESS NOTES
[facility]    Medicine Progress Note - Hospitalist Service       Date of Admission:  7/28/2019  Assessment & Plan      Jemima Yuen is a 58 year old female with history of seizure disorder, tobacco abuse and hypertension who presented to the emergency room 7/28/19 with fever, chills, weakness, and left flank pain.  Patient has had frequency and dysuria for 2 to 3 weeks and symptoms have been progressively worsening. She notes onset of left flank pain over the last several days. Noted ongoing nausea but denied vomiting.  Has a history of UTIs but is never had a kidney infection or sepsis. Upon arrival, patient noted to have a fever of 102.2. Patient was also noted to be tachycardic and had an elevated WBC count of 12 and a lactate of 1.6. UA was collected and appeared to be indicative of UTI. Patient treated for possible sepsis in the ED with IV fluids and IV Rocephin for suspected pyelonephritis. Blood and urine cultures collected and results are pending. Patient is at high risk for an adverse outcome including worsening weakness, inadequate oral intake, worsening functional status, and even death, so hospitalization is considered medically necessary.  Based on the presently available information, hospitalization for at least 2 midnights is anticipated.    Principal Problem:    Pyelonephritis, acute  Assessment: Patient presented with a several week history of urinary frequency and dysuria with fever, chills, weakness and left flank pain. Has a history of UTIs but is never had a kidney infection or sepsis. Upon arrival, patient noted to have a fever of 102.2. Patient was also noted to be tachycardic and had an elevated WBC count of 12 and a lactate of 1.6. UA was collected and appeared to be indicative of UTI. Patient treated for possible sepsis in the ED with IV fluids and IV Rocephin for suspected pyelonephritis. 7/29-Patient notes improvement in left flank pain and states pain is  well managed with as needed Norco. Denies shortness of breath and patient is maintaining oxygen saturations above 90% on room air. Denies nausea but appetite remains decreased. She is drinking fluids. Patient continues to have low grade fevers with a high of 100.5 today. Heart rate is slowly improving in the 90-low 100's. Blood pressure remains stable. Urine culture and blood cultures both growing E coli and susceptibility testing is in progress. Renal ultrasound completed and negative for nephrolithiasis.   Plan: Will continue IV Rocephin until susceptibility testing is completed. Will continue IV fluids but reduce rate to 75 mL/hr.  Will order renal ultrasound to evaluate for nephrolithiasis. Continue IV dilaudid and Norco as needed for pain. IV antiemetics available for nausea. Will continue to monitor vital signs closely.    Active Problems:    Sepsis due to gram negative bacteremia  Assessment: Patient presented with symptoms concerning for sepsis due to pyelonephritis. Found to have fever, leukocytosis, and tachycardia. Lactic acid normal at 1.6. 7/28-Leukocytosis improved today with a WBC of 5.9 down from 12 upon admission. Patient continues to have fever but heart rate is improving. Of note, patient tachycardic at baseline. Recent office visits show heart rate consistently over 100.   Plan: Treatment of infection as above. Will await blood and urine culture susceptibilities. Continue to monitor vital signs closely.     Seizure disorder (H)  Assessment: Chronic and managed with Depakote  mg twice daily   Plan: Home dose of Depakote ordered while patient in hospital.      Diet: Combination Diet Regular Diet Adult    DVT Prophylaxis: Pneumatic Compression Devices  Gregg Catheter: not present  Code Status: Full Code      Disposition Plan   Expected discharge: 1-2 days, recommended to prior living arrangement once adequate pain management/ tolerating PO medications, antibiotic plan established and  SIRS/Sepsis treated.  Entered: Raymond Cowart NP 07/29/2019, 1:11 PM       The patient's care was discussed with the Attending Physician, Dr. Perez, Patient and Patient's Family.    Raymond Cowart NP  Hospitalist Service  [facility]    ______________________________________________________________________    Interval History   Patient seen sitting up in bed with no new complaints. Notes she is feeling much better today. Patient notes improvement in left flank pain and states pain is well managed with as needed Norco. Denies shortness of breath and patient is maintaining oxygen saturations above 90% on room air. Denies nausea but appetite remains decreased. She is drinking fluids. Patient continues to have low grade fevers with a high of 100.5 today. Heart rate is slowly improving in the 90-low 100's. Blood pressure remains stable. Urine culture and blood cultures both growing E coli and susceptibility testing is in progress. Renal ultrasound completed and negative for nephrolithiasis.     Data reviewed today: I reviewed all medications, new labs and imaging results over the last 24 hours.    Physical Exam   Vital Signs: Temp: 100  F (37.8  C) Temp src: Axillary BP: 138/57 Pulse: 93 Heart Rate: 117 Resp: 16 SpO2: 97 % O2 Device: None (Room air)    Weight: 108 lbs 10.99 oz  Constitutional: awake, alert, cooperative, no apparent distress, and appears stated age  Respiratory: No increased work of breathing, good air exchange, clear to auscultation bilaterally, no crackles or wheezing  Cardiovascular: regular rate and rhythm, normal S1 and S2 and no murmur noted  GI: normal bowel sounds, non-distended and non-tender  Skin: no bruising or bleeding and normal skin color, texture, turgor  Musculoskeletal: no lower extremity pitting edema present  there is no redness, warmth, or swelling of the joints  Neurologic: Awake, alert, oriented to name, place and time.      Data   Recent Labs   Lab  07/29/19  0603 07/28/19  1256   WBC 5.9 12.0*   HGB 10.8* 13.6   MCV 93 92   * 166   INR  --  1.20*    133   POTASSIUM 3.2* 3.6   CHLORIDE 106 97   CO2 21 26   BUN 10 12   CR 0.57 0.67   ANIONGAP 10 10   MIMA 7.5* 9.1   GLC 61* 105*   ALBUMIN  --  3.1*   PROTTOTAL  --  6.7*   BILITOTAL  --  0.7   ALKPHOS  --  47   ALT  --  11   AST  --  15     Recent Results (from the past 24 hour(s))   US Renal Complete    Narrative    RENAL ULTRASOUND July 29, 2019 10:03 AM     HISTORY: Left flank pain, evaluate for nephrolithiasis.    COMPARISON: None.    FINDINGS: The kidneys are grossly normal in size measuring 9.1 and 9.0  cm in length bilaterally. Cortices are thinned at 0.5 cm in thickness  bilaterally and the kidneys are hyperechoic concerning for medical  renal disease. No hydronephrosis is identified. No nephrolithiasis or  hydroureter. Two cystic structures in the left kidney are noted with  one in the mid left kidney measuring 1.2 x 1.2 x 1.5 cm and one in the  inferior pole measuring 1.3 x 1.2 x 1.1 cm.    Urinary bladder was incompletely distended with 12 mL in volume.  Patient voided just prior to the exam. Urinary bladder wall is within  normal limits. Ureteral jets are not seen likely due to incomplete  distention of the urinary bladder.      Impression    IMPRESSION:  1. Thinned hyperechoic bilateral kidneys are concerning for medical  renal disease. The kidneys are normal in length.  2. Left renal cyst.  3. No other significant abnormality is identified. No evidence for  urinary system obstruction.     Medications     sodium chloride 150 mL/hr at 07/29/19 1144       cefTRIAXone  1 g Intravenous Q24H     divalproex sodium extended-release  500 mg Oral BID     sodium chloride (PF)  3 mL Intracatheter Q8H

## 2019-07-29 NOTE — PROVIDER NOTIFICATION
Dr. Ramirez informed of Critical lab @ 9407.  Left arm blood cultures drawn at 1256 on 7 28.  Positive for GRAM - RODS

## 2019-07-30 VITALS
WEIGHT: 108.69 LBS | TEMPERATURE: 96.1 F | RESPIRATION RATE: 18 BRPM | BODY MASS INDEX: 22.81 KG/M2 | DIASTOLIC BLOOD PRESSURE: 76 MMHG | HEIGHT: 58 IN | HEART RATE: 97 BPM | OXYGEN SATURATION: 97 % | SYSTOLIC BLOOD PRESSURE: 137 MMHG

## 2019-07-30 PROBLEM — A41.51 SEPSIS DUE TO ESCHERICHIA COLI (H): Status: ACTIVE | Noted: 2019-07-28

## 2019-07-30 LAB
ANION GAP SERPL CALCULATED.3IONS-SCNC: 5 MMOL/L (ref 3–14)
BUN SERPL-MCNC: 7 MG/DL (ref 7–30)
CALCIUM SERPL-MCNC: 7.6 MG/DL (ref 8.5–10.1)
CHLORIDE SERPL-SCNC: 108 MMOL/L (ref 94–109)
CO2 SERPL-SCNC: 23 MMOL/L (ref 20–32)
CREAT SERPL-MCNC: 0.51 MG/DL (ref 0.52–1.04)
ERYTHROCYTE [DISTWIDTH] IN BLOOD BY AUTOMATED COUNT: 17 % (ref 10–15)
GFR SERPL CREATININE-BSD FRML MDRD: >90 ML/MIN/{1.73_M2}
GLUCOSE SERPL-MCNC: 88 MG/DL (ref 70–99)
HCT VFR BLD AUTO: 34.5 % (ref 35–47)
HGB BLD-MCNC: 11.3 G/DL (ref 11.7–15.7)
MCH RBC QN AUTO: 30.9 PG (ref 26.5–33)
MCHC RBC AUTO-ENTMCNC: 32.8 G/DL (ref 31.5–36.5)
MCV RBC AUTO: 94 FL (ref 78–100)
PLATELET # BLD AUTO: 89 10E9/L (ref 150–450)
POTASSIUM SERPL-SCNC: 3.3 MMOL/L (ref 3.4–5.3)
RBC # BLD AUTO: 3.66 10E12/L (ref 3.8–5.2)
SODIUM SERPL-SCNC: 136 MMOL/L (ref 133–144)
WBC # BLD AUTO: 4.8 10E9/L (ref 4–11)

## 2019-07-30 PROCEDURE — 25000132 ZZH RX MED GY IP 250 OP 250 PS 637: Performed by: PEDIATRICS

## 2019-07-30 PROCEDURE — 85027 COMPLETE CBC AUTOMATED: CPT | Performed by: NURSE PRACTITIONER

## 2019-07-30 PROCEDURE — 36415 COLL VENOUS BLD VENIPUNCTURE: CPT | Performed by: NURSE PRACTITIONER

## 2019-07-30 PROCEDURE — 25000132 ZZH RX MED GY IP 250 OP 250 PS 637: Performed by: NURSE PRACTITIONER

## 2019-07-30 PROCEDURE — 80048 BASIC METABOLIC PNL TOTAL CA: CPT | Performed by: NURSE PRACTITIONER

## 2019-07-30 PROCEDURE — 99238 HOSP IP/OBS DSCHRG MGMT 30/<: CPT | Performed by: PEDIATRICS

## 2019-07-30 RX ORDER — CEFDINIR 300 MG/1
600 CAPSULE ORAL DAILY
Qty: 22 CAPSULE | Refills: 0 | Status: SHIPPED | OUTPATIENT
Start: 2019-07-31 | End: 2019-08-11

## 2019-07-30 RX ORDER — CEFDINIR 300 MG/1
600 CAPSULE ORAL DAILY
Status: DISCONTINUED | OUTPATIENT
Start: 2019-07-30 | End: 2019-07-30 | Stop reason: HOSPADM

## 2019-07-30 RX ORDER — POTASSIUM CHLORIDE 1500 MG/1
20 TABLET, EXTENDED RELEASE ORAL 2 TIMES DAILY
Qty: 60 TABLET | Refills: 0 | Status: SHIPPED | OUTPATIENT
Start: 2019-07-30 | End: 2020-09-29

## 2019-07-30 RX ORDER — HYDROCODONE BITARTRATE AND ACETAMINOPHEN 5; 325 MG/1; MG/1
1-2 TABLET ORAL EVERY 4 HOURS PRN
Qty: 10 TABLET | Refills: 0 | Status: SHIPPED | OUTPATIENT
Start: 2019-07-30 | End: 2020-09-29

## 2019-07-30 RX ADMIN — CEFDINIR 600 MG: 300 CAPSULE ORAL at 09:05

## 2019-07-30 RX ADMIN — POTASSIUM CHLORIDE 20 MEQ: 1500 TABLET, EXTENDED RELEASE ORAL at 09:05

## 2019-07-30 RX ADMIN — POTASSIUM CHLORIDE 40 MEQ: 1500 TABLET, EXTENDED RELEASE ORAL at 06:27

## 2019-07-30 RX ADMIN — DIVALPROEX SODIUM 500 MG: 250 TABLET, FILM COATED, EXTENDED RELEASE ORAL at 09:05

## 2019-07-30 ASSESSMENT — ACTIVITIES OF DAILY LIVING (ADL)
ADLS_ACUITY_SCORE: 22

## 2019-07-30 NOTE — PLAN OF CARE
"Decrease in temperature to 97 at 0500. Tachycardic. Alert and oriented. Lung sounds clear and equal bilaterally.  Norco given once for left lower flank pain. Incontinent, ambulated to bathroom with 1 assist. Voiding frequently. Pt unsteady on feet. Continue to monitor pain.     BP (!) 142/69 (BP Location: Left arm)   Pulse 125   Temp 97  F (36.1  C) (Oral)   Resp 18   Ht 1.473 m (4' 10\")   Wt 49.3 kg (108 lb 11 oz)   LMP 12/07/2010   SpO2 94%   BMI 22.72 kg/m                    "

## 2019-07-30 NOTE — DISCHARGE SUMMARY
[facility]  Hospitalist Discharge Summary       Date of Admission:  7/28/2019  Date of Discharge:  7/30/2019  Discharging Provider: Dwight Perez MD      Discharge Diagnoses   Principal Problem:    Pyelonephritis, acute  Active Problems:    Sepsis due to Escherichia coli (H)    Seizure disorder (H)      Follow-ups Needed After Discharge   Follow-up Appointments     Follow-up and recommended labs and tests       Follow up with primary care provider, Dr. Chantal Joseph, within 7 days to evaluate medication change and for hospital   follow- up.  The following labs/tests are recommended: potassium.             Unresulted Labs Ordered in the Past 30 Days of this Admission     Date and Time Order Name Status Description    7/28/2019 1732 Blood culture Preliminary     7/28/2019 1732 Blood culture Preliminary     7/28/2019 1240 Blood culture Preliminary     7/28/2019 1240 Blood culture Preliminary       These results will be followed up by PCP    Discharge Disposition   Discharged to home  Condition at discharge: Stable    Hospital Course   58-year-old woman with seizure disorder presented with almost 3-week history of urinary frequency and dysuria and several day history of increasing left flank pain.  Due to concern for pyelonephritis with sepsis, she was admitted.  Urine culture and 2 initial blood cultures grew E. coli.  Renal ultrasound demonstrated findings consistent with medical renal disease but no obstruction.  She was treated with parenteral antibiotics and IV fluids.  She did not require vasopressors.  She improved with resolving signs of sepsis and improved left flank pain.  Nausea resolved and oral intake improved.  She was discharged to complete a 2-week course of antibiotic treatment of bilateral pyelonephritis with sepsis due to E. coli.    Consultations This Hospital Stay   None    Code Status   Prior    Time Spent on this Encounter   Dwight BRAVO  Ana, personally saw the patient today and spent less than or equal to 30 minutes discharging this patient.       Dwight Perez MD  [facility]  ______________________________________________________________________    Physical Exam   Vital Signs: Temp: 96.1  F (35.6  C) Temp src: Oral BP: 137/76 Pulse: 97   Resp: 18 SpO2: 97 % O2 Device: None (Room air)    Weight: 108 lbs 10.99 oz  General Appearance: No distress sitting in bed  Other: Alert, no confusion       Primary Care Physician   Physician No Ref-Primary    Discharge Orders      Reason for your hospital stay    Hospitalized for kidney infection (pyelonephritis) with bloodstream infection (bacteremia) due to E coli and improved     Follow-up and recommended labs and tests     Follow up with primary care provider, Dr. Chantal Joseph, within 7 days to evaluate medication change and for hospital follow- up.  The following labs/tests are recommended: potassium.     Activity    Your activity upon discharge: activity as tolerated     Diet    Follow this diet upon discharge: Orders Placed This Encounter      Combination Diet Regular Diet Adult       Significant Results and Procedures   Most Recent 3 CBC's:  Recent Labs   Lab Test 07/30/19  0550 07/29/19  0603 07/28/19  1256   WBC 4.8 5.9 12.0*   HGB 11.3* 10.8* 13.6   MCV 94 93 92   PLT 89* 100* 166     Most Recent 3 BMP's:  Recent Labs   Lab Test 07/30/19  0550 07/29/19  1625 07/29/19  0603 07/28/19  1256     --  137 133   POTASSIUM 3.3* 3.9 3.2* 3.6   CHLORIDE 108  --  106 97   CO2 23  --  21 26   BUN 7  --  10 12   CR 0.51*  --  0.57 0.67   ANIONGAP 5  --  10 10   MIMA 7.6*  --  7.5* 9.1   GLC 88  --  61* 105*     Most Recent 6 Bacteria Isolates From Any Culture (See EPIC Reports for Culture Details):  Recent Labs   Lab Test 07/28/19 1819 07/28/19  1811 07/28/19  1807 07/28/19  1256 06/06/14  1002 06/06/12  1425   CULT No growth after 2 days No growth  after 2 days No MRSA isolated Cultured on the 1st day of incubation:  Escherichia coli  *  Critical Value/Significant Value, preliminary result only, called to and read back by  DERRICK VAUGHAN RN 0532 07/29/19 AA    Susceptibility testing done on previous specimen  Cultured on the 1st day of incubation:  Escherichia coli  *  Critical Value/Significant Value, preliminary result only, called to and read back by  DAVID BOLANOS RN 0431 07/29/19 AA    (Note)  POSITIVE for E.COLI by Verigene multiplex nucleic acid test. Final  identification and antimicrobial susceptibility testing will be  verified by standard methods. Verigene test will not distinguish  E.coli from Shigella species including S.dysenteriae, S.flexneri,  S.boydii, and S.sonnei. Specimens containing Shigella species or  E.coli will be reported as Positive for E.coli.    Specimen tested with Verigene multiplex, gram-negative blood culture  nucleic acid test for the following targets: Acinetobacter sp.,  Citrobacter sp., Enterobacter sp., Proteus sp., E. coli, K.  pneumoniae/oxytoca, P. aeruginosa, and the following resistance  markers: CTXM, KPC, NDM, VIM, IMP and OXA.    Critical Value/Significant Value called to and read back by Derrick Vaughan RN at 0645 on 7.29.19 by SS.    >100,000 colonies/mL  Escherichia coli  * <10,000 colonies/mL Mixed gram negative altagracia 10 to 50,000 colonies/mL Mixed gram positive altagracia   ,   Results for orders placed or performed during the hospital encounter of 07/28/19   US Renal Complete    Narrative    RENAL ULTRASOUND July 29, 2019 10:03 AM     HISTORY: Left flank pain, evaluate for nephrolithiasis.    COMPARISON: None.    FINDINGS: The kidneys are grossly normal in size measuring 9.1 and 9.0  cm in length bilaterally. Cortices are thinned at 0.5 cm in thickness  bilaterally and the kidneys are hyperechoic concerning for medical  renal disease. No hydronephrosis is identified. No nephrolithiasis or  hydroureter. Two  cystic structures in the left kidney are noted with  one in the mid left kidney measuring 1.2 x 1.2 x 1.5 cm and one in the  inferior pole measuring 1.3 x 1.2 x 1.1 cm.    Urinary bladder was incompletely distended with 12 mL in volume.  Patient voided just prior to the exam. Urinary bladder wall is within  normal limits. Ureteral jets are not seen likely due to incomplete  distention of the urinary bladder.      Impression    IMPRESSION:  1. Thinned hyperechoic bilateral kidneys are concerning for medical  renal disease. The kidneys are normal in length.  2. Left renal cyst.  3. No other significant abnormality is identified. No evidence for  urinary system obstruction.    ABRAN TEIXEIRA MD       Discharge Medications   Discharge Medication List as of 7/30/2019  9:17 AM      START taking these medications    Details   cefdinir (OMNICEF) 300 MG capsule Take 2 capsules (600 mg) by mouth daily for 11 days, Disp-22 capsule, R-0, E-Prescribe      HYDROcodone-acetaminophen (NORCO) 5-325 MG tablet Take 1-2 tablets by mouth every 4 hours as needed for moderate to severe pain, Disp-10 tablet, R-0, Local Print      potassium chloride ER (K-DUR/KLOR-CON M) 20 MEQ CR tablet Take 1 tablet (20 mEq) by mouth 2 times daily, Disp-60 tablet, R-0, E-Prescribe         CONTINUE these medications which have NOT CHANGED    Details   DEPAKOTE  MG OR TB24 2 TABLET s in the am and 2 in the PM DAILY, Oral, R-0, Historical      MULTIPLE VITAMIN OR TABS Oral, R-0, Historical      cyclobenzaprine (FLEXERIL) 10 MG tablet Take 0.5-1 tablets (5-10 mg) by mouth 3 times daily as needed for muscle spasms, Disp-30 tablet, R-1, E-Prescribe           Allergies   Allergies   Allergen Reactions     Codeine Itching     Percocet [Oxycodone-Acetaminophen] GI Disturbance     Vicodin [Hydrocodone-Acetaminophen] GI Disturbance

## 2019-07-30 NOTE — PROGRESS NOTES
"S-(situation): Patient discharged to home with sister    B-(background): pylonephritis    A-(assessment): VSS, afebrile RA. pain controlled with medication. Patient states \"I am feeling much better\". meds reviewed.    R-(recommendations): Discharge instructions reviewed with patient. Listed belongings gathered and returned to patient.          Discharge Nursing Criteria:     Care Plan and Patient education resolved: Yes    New Medications- pt has been educated about purpose and side effects: Yes    MISC  Prescriptions if needed, hard copies sent with patient  Yes  Home and hospital aquired medications returned to patient: Yes  Medication Bin checked and emptied on discharge Yes  Patient reports post-discharge pain management plan is effective: Yes      "

## 2019-07-31 LAB
BACTERIA SPEC CULT: ABNORMAL
Lab: ABNORMAL
Lab: ABNORMAL
SPECIMEN SOURCE: ABNORMAL
SPECIMEN SOURCE: ABNORMAL

## 2019-08-03 LAB
BACTERIA SPEC CULT: NO GROWTH
BACTERIA SPEC CULT: NO GROWTH
Lab: NORMAL
Lab: NORMAL
SPECIMEN SOURCE: NORMAL
SPECIMEN SOURCE: NORMAL

## 2019-08-08 NOTE — PROGRESS NOTES
"Jemima AVILES [facility] [[email or web address]]  Gender: female  : 1960  [address]  [facility] [place] [address] [phone] (home)     Medical Record: [id number]  Pharmacy: [id number] - [place], [place] - [address] [facility]  Primary Care Provider: No Ref-Primary, Physician    Parent's names are: Olena Santiago (mother) and Data Unavailable (father).      [facility]  2019     Discharge Phone Call:  Key Words/Key Times      Introduction - AIDET (Acknowledge, Introduce, Duration, Explanation)      Empathy-   We are calling to see how you are since your recent stay in the hospital?     Call back COMMENTS:       Clinical Questions -  (f/u appts, medication side effects/purpose, ability to care for self at home) \"For your safety, it is important to us that you understand the purpose and side effects of your medications, can you tell me what your new medications are?\"     Call back COMMENTS:       Staff Recognition -  We like to recognize staff and physicians who have done an excellent job.  Do you remember any people from your care team that you would like recognize?     Call back COMMENTS: I don't remember the names.       Very Good Care -  We want to provide very good care to all patients.  How was your care?     Call back COMMENTS: My care was good      Opportunities for Improvement -  Our goal is to be the best.  Do you have any suggestions for things that we could improve upon?     Call back COMMENTS: No      Thank You         "

## 2019-09-28 ENCOUNTER — HEALTH MAINTENANCE LETTER (OUTPATIENT)
Age: 59
End: 2019-09-28

## 2020-09-29 ENCOUNTER — HOSPITAL ENCOUNTER (EMERGENCY)
Facility: CLINIC | Age: 60
Discharge: HOME | End: 2020-09-29
Attending: EMERGENCY MEDICINE | Admitting: EMERGENCY MEDICINE
Payer: COMMERCIAL

## 2020-09-29 VITALS
OXYGEN SATURATION: 98 % | BODY MASS INDEX: 22.44 KG/M2 | TEMPERATURE: 98.2 F | HEART RATE: 95 BPM | DIASTOLIC BLOOD PRESSURE: 89 MMHG | SYSTOLIC BLOOD PRESSURE: 144 MMHG | RESPIRATION RATE: 20 BRPM | WEIGHT: 104 LBS | HEIGHT: 57 IN

## 2020-09-29 DIAGNOSIS — T78.40XA ALLERGIC REACTION, INITIAL ENCOUNTER: ICD-10-CM

## 2020-09-29 PROCEDURE — 25000131 ZZH RX MED GY IP 250 OP 636 PS 637: Performed by: EMERGENCY MEDICINE

## 2020-09-29 PROCEDURE — 99283 EMERGENCY DEPT VISIT LOW MDM: CPT | Performed by: EMERGENCY MEDICINE

## 2020-09-29 PROCEDURE — 99284 EMERGENCY DEPT VISIT MOD MDM: CPT | Mod: Z6 | Performed by: EMERGENCY MEDICINE

## 2020-09-29 RX ORDER — PREDNISONE 20 MG/1
60 TABLET ORAL ONCE
Status: COMPLETED | OUTPATIENT
Start: 2020-09-29 | End: 2020-09-29

## 2020-09-29 RX ORDER — PREDNISONE 20 MG/1
TABLET ORAL
Qty: 7 TABLET | Refills: 0 | Status: SHIPPED | OUTPATIENT
Start: 2020-09-29 | End: 2020-10-05

## 2020-09-29 RX ORDER — ALENDRONATE SODIUM 70 MG/1
70 TABLET ORAL WEEKLY
COMMUNITY
Start: 2020-06-22

## 2020-09-29 RX ADMIN — PREDNISONE 60 MG: 20 TABLET ORAL at 04:47

## 2020-09-29 ASSESSMENT — MIFFLIN-ST. JEOR: SCORE: 915.62

## 2020-09-29 NOTE — ED AVS SNAPSHOT
[facility]  [address] [place] [address]  Phone:  [phone]  Fax:  [phone]                                    Jemima Yuen   MRN: [id number]    Department:  [facility]   Date of Visit:  9/29/2020           After Visit Summary Signature Page    I have received my discharge instructions, and my questions have been answered. I have discussed any challenges I see with this plan with the nurse or doctor.    ..........................................................................................................................................  Patient/Patient Representative Signature      ..........................................................................................................................................  Patient Representative Print Name and Relationship to Patient    ..................................................               ................................................  Date                                   Time    ..........................................................................................................................................  Reviewed by Signature/Title    ...................................................              ..............................................  Date                                               Time          22EPIC Rev 08/18

## 2020-09-29 NOTE — ED PROVIDER NOTES
History     Chief Complaint   Patient presents with     Allergic Reaction     HPI  Jemima Yuen is a 59 year old female who presents to the ER secondary to an allergic reaction.  This started spontaneously at 11 am tomorrow - about 17 hours pta.  It is painful and itchy.  No mouth swelling, tongue swelling, throat swelling or tightness.  No wheezing or chest pain.  No nausea, vomiting or other new symptoms. No new medications, creams, lotions, exposures etc. No fevers.  She is home almost all the time, taking care of her elderly mother.  She did get a new air mattress and that is her only new exposure = wonders if there was a powder in there.     Allergies:  Allergies   Allergen Reactions     Codeine Itching     Percocet [Oxycodone-Acetaminophen] GI Disturbance     Vicodin [Hydrocodone-Acetaminophen] GI Disturbance       Problem List:    Patient Active Problem List    Diagnosis Date Noted     Seizure disorder (H) 07/28/2019     Priority: Medium     Sepsis due to Escherichia coli (H) 07/28/2019     Priority: Medium     Pyelonephritis, acute 07/28/2019     Priority: Medium     Mammogram declined 12/03/2014     Priority: Medium     Pap smear of cervix declined 12/03/2014     Priority: Medium     Tobacco use disorder 12/03/2014     Priority: Medium     Ovarian cyst 03/15/2011     Priority: Medium     Convulsions (H)      Priority: Medium     Epilepsy  Problem list name updated by automated process. Provider to review       CARDIOVASCULAR SCREENING; LDL GOAL LESS THAN 160 10/31/2010     Priority: Medium     Hypertension 03/17/2009     Priority: Medium        Past Medical History:    Past Medical History:   Diagnosis Date     Excessive or frequent menstruation 2/01     Headache(784.0)      Hypertension 3/17/2009     Other convulsions      Other unspecified back disorder        Past Surgical History:    Past Surgical History:   Procedure Laterality Date     C LIGATE FALLOPIAN TUBE,POSTPARTUM  1981    Tubal Ligation      "COLONOSCOPY  2011    Procedure:COMBINED COLONOSCOPY, SINGLE BIOPSY/POLYPECTOMY BY BIOPSY; Surgeon:PARRISH PINON; Location:[facility]     HC KNEE SCOPE, DIAGNOSTIC  10/95,     Arthroscopy,  Right Knee x 2     HC REPAIR INTERCARP/CARP-METACARP JT  08    left wrist       Family History:    Family History   Problem Relation Age of Onset     Neurologic Disorder Mother         epilepsy       Social History:  Marital Status:   [2]  Social History     Tobacco Use     Smoking status: Current Every Day Smoker     Packs/day: 0.25     Last attempt to quit: 2011     Years since quittin.6     Smokeless tobacco: Never Used     Tobacco comment: no smokers in household   Substance Use Topics     Alcohol use: Yes     Comment: dennis x 1/<1x per yr     Drug use: No     Comment: Marijuana in past/nothing since         Medications:    alendronate (FOSAMAX) 70 MG tablet  cyclobenzaprine (FLEXERIL) 10 MG tablet  DEPAKOTE  MG OR TB24  predniSONE (DELTASONE) 20 MG tablet          Review of Systems   All other systems reviewed and are negative.      Physical Exam   BP: (!) 163/106  Pulse: 100  Temp: 98.2  F (36.8  C)  Resp: 20  Height: 144.8 cm (4' 9\")  Weight: 47.2 kg (104 lb)  SpO2: 99 %      Physical Exam  Vitals signs and nursing note reviewed.   Constitutional:       General: She is not in acute distress.     Appearance: She is not ill-appearing.      Comments: Obvious swollen face   HENT:      Head: Normocephalic and atraumatic.      Right Ear: External ear normal.      Left Ear: External ear normal.      Nose: Nose normal. No congestion or rhinorrhea.      Mouth/Throat:      Mouth: Mucous membranes are moist.      Pharynx: Oropharynx is clear. No oropharyngeal exudate or posterior oropharyngeal erythema.   Eyes:      General: No scleral icterus.        Right eye: No discharge.         Left eye: No discharge.      Extraocular Movements: Extraocular movements intact.      Conjunctiva/sclera: " Conjunctivae normal.      Pupils: Pupils are equal, round, and reactive to light.   Neck:      Musculoskeletal: Normal range of motion and neck supple. No neck rigidity or muscular tenderness.   Cardiovascular:      Rate and Rhythm: Normal rate and regular rhythm.      Heart sounds: Normal heart sounds. No murmur.   Pulmonary:      Effort: Pulmonary effort is normal. No respiratory distress.      Breath sounds: Normal breath sounds. No stridor. No wheezing, rhonchi or rales.   Chest:      Chest wall: No tenderness.   Abdominal:      Tenderness: There is no abdominal tenderness. There is no right CVA tenderness, left CVA tenderness or rebound.   Musculoskeletal: Normal range of motion.         General: No swelling, deformity or signs of injury.   Skin:     General: Skin is warm.      Findings: Rash present.      Comments: Pink puffy appearing face - entire face with no lip swelling, tongue swelling or OP swelling. Tiny vessicles over forehead.   Neurological:      General: No focal deficit present.      Mental Status: She is alert and oriented to person, place, and time.         ED Course        Procedures                   No results found for this or any previous visit (from the past 24 hour(s)).    Medications   predniSONE (DELTASONE) tablet 60 mg (60 mg Oral Given 9/29/20 0447)       Assessments & Plan (with Medical Decision Making)  59 yr old female with allergic reaction, unknown etiology.  No anaphylaxis, no intraoral swelling, no wheezing or sob. Pt given 60 mg of prednisone in the ED and will put her on a tapering dose as an outpatient.  Return to ER precautions, follow up precautions discussed. May use benadryl at night and zyrtec during the day. The diagnosis, tx options, risks and follow up discussed.      I have reviewed the nursing notes.    I have reviewed the findings, diagnosis, plan and need for follow up with the patient.      New Prescriptions    PREDNISONE (DELTASONE) 20 MG TABLET    Take 2  tablets (40 mg) by mouth daily for 2 days, THEN 1 tablet (20 mg) daily for 2 days, THEN 0.5 tablets (10 mg) daily for 2 days.       Final diagnoses:   Allergic reaction, initial encounter       9/29/2020   [facility] EMERGENCY DEPARTMENT     Paulo Bird MD  09/29/20 0458

## 2020-09-29 NOTE — ED TRIAGE NOTES
Pt in with swelling and rash on face started yesterday afternoon.  Patient's airway, breathing, circulation, and disability/mental status (ABCDs) intact/WDL during triage.

## 2020-09-29 NOTE — DISCHARGE INSTRUCTIONS
Return to the ER if new or worsening symptoms, take the prednisone as discussed (first dose of prescription tomorrow morning).  You may also use benadryl or if during the day, zyrtec or other non sedating antihistamine.  Return to er or call 911 if mouth or throat swelling, difficulty breathing or worsening symptoms.

## 2021-01-10 ENCOUNTER — HEALTH MAINTENANCE LETTER (OUTPATIENT)
Age: 61
End: 2021-01-10

## 2021-08-08 ENCOUNTER — HOSPITAL ENCOUNTER (EMERGENCY)
Facility: CLINIC | Age: 61
Discharge: HOME | End: 2021-08-09
Attending: FAMILY MEDICINE | Admitting: FAMILY MEDICINE
Payer: COMMERCIAL

## 2021-08-08 DIAGNOSIS — N20.1 CALCULUS OF URETER: ICD-10-CM

## 2021-08-08 LAB
ALBUMIN SERPL-MCNC: 3.4 G/DL (ref 3.4–5)
ALP SERPL-CCNC: 62 U/L (ref 40–150)
ALT SERPL W P-5'-P-CCNC: 13 U/L (ref 0–50)
ANION GAP SERPL CALCULATED.3IONS-SCNC: 13 MMOL/L (ref 3–14)
AST SERPL W P-5'-P-CCNC: 16 U/L (ref 0–45)
BASOPHILS # BLD AUTO: 0 10E3/UL (ref 0–0.2)
BASOPHILS NFR BLD AUTO: 0 %
BILIRUB SERPL-MCNC: 0.5 MG/DL (ref 0.2–1.3)
BUN SERPL-MCNC: 16 MG/DL (ref 7–30)
CALCIUM SERPL-MCNC: 9.3 MG/DL (ref 8.5–10.1)
CHLORIDE BLD-SCNC: 100 MMOL/L (ref 94–109)
CO2 SERPL-SCNC: 21 MMOL/L (ref 20–32)
CREAT SERPL-MCNC: 0.69 MG/DL (ref 0.52–1.04)
EOSINOPHIL # BLD AUTO: 0 10E3/UL (ref 0–0.7)
EOSINOPHIL NFR BLD AUTO: 0 %
ERYTHROCYTE [DISTWIDTH] IN BLOOD BY AUTOMATED COUNT: 14.5 % (ref 10–15)
GFR SERPL CREATININE-BSD FRML MDRD: >90 ML/MIN/1.73M2
GLUCOSE BLD-MCNC: 99 MG/DL (ref 70–99)
HCT VFR BLD AUTO: 40.8 % (ref 35–47)
HGB BLD-MCNC: 13.8 G/DL (ref 11.7–15.7)
IMM GRANULOCYTES # BLD: 0 10E3/UL
IMM GRANULOCYTES NFR BLD: 0 %
LACTATE SERPL-SCNC: 1.3 MMOL/L (ref 0.7–2)
LIPASE SERPL-CCNC: 51 U/L (ref 73–393)
LYMPHOCYTES # BLD AUTO: 1.1 10E3/UL (ref 0.8–5.3)
LYMPHOCYTES NFR BLD AUTO: 14 %
MCH RBC QN AUTO: 31.8 PG (ref 26.5–33)
MCHC RBC AUTO-ENTMCNC: 33.8 G/DL (ref 31.5–36.5)
MCV RBC AUTO: 94 FL (ref 78–100)
MONOCYTES # BLD AUTO: 0.5 10E3/UL (ref 0–1.3)
MONOCYTES NFR BLD AUTO: 7 %
NEUTROPHILS # BLD AUTO: 6.2 10E3/UL (ref 1.6–8.3)
NEUTROPHILS NFR BLD AUTO: 79 %
NRBC # BLD AUTO: 0 10E3/UL
NRBC BLD AUTO-RTO: 0 /100
PLATELET # BLD AUTO: 226 10E3/UL (ref 150–450)
POTASSIUM BLD-SCNC: 4.7 MMOL/L (ref 3.4–5.3)
PROT SERPL-MCNC: 6.9 G/DL (ref 6.8–8.8)
RBC # BLD AUTO: 4.34 10E6/UL (ref 3.8–5.2)
SODIUM SERPL-SCNC: 134 MMOL/L (ref 133–144)
WBC # BLD AUTO: 7.8 10E3/UL (ref 4–11)

## 2021-08-08 PROCEDURE — 96374 THER/PROPH/DIAG INJ IV PUSH: CPT | Mod: 59 | Performed by: FAMILY MEDICINE

## 2021-08-08 PROCEDURE — 83690 ASSAY OF LIPASE: CPT | Performed by: FAMILY MEDICINE

## 2021-08-08 PROCEDURE — 96375 TX/PRO/DX INJ NEW DRUG ADDON: CPT | Performed by: FAMILY MEDICINE

## 2021-08-08 PROCEDURE — 99285 EMERGENCY DEPT VISIT HI MDM: CPT | Performed by: FAMILY MEDICINE

## 2021-08-08 PROCEDURE — 250N000011 HC RX IP 250 OP 636: Performed by: FAMILY MEDICINE

## 2021-08-08 PROCEDURE — 96361 HYDRATE IV INFUSION ADD-ON: CPT | Performed by: FAMILY MEDICINE

## 2021-08-08 PROCEDURE — 36415 COLL VENOUS BLD VENIPUNCTURE: CPT | Performed by: FAMILY MEDICINE

## 2021-08-08 PROCEDURE — 83605 ASSAY OF LACTIC ACID: CPT | Performed by: FAMILY MEDICINE

## 2021-08-08 PROCEDURE — 85025 COMPLETE CBC W/AUTO DIFF WBC: CPT | Performed by: FAMILY MEDICINE

## 2021-08-08 PROCEDURE — 80053 COMPREHEN METABOLIC PANEL: CPT | Performed by: FAMILY MEDICINE

## 2021-08-08 PROCEDURE — 99285 EMERGENCY DEPT VISIT HI MDM: CPT | Mod: 25 | Performed by: FAMILY MEDICINE

## 2021-08-08 PROCEDURE — 258N000003 HC RX IP 258 OP 636: Performed by: FAMILY MEDICINE

## 2021-08-08 PROCEDURE — 81001 URINALYSIS AUTO W/SCOPE: CPT | Performed by: FAMILY MEDICINE

## 2021-08-08 RX ORDER — FAMOTIDINE 20 MG/1
20 TABLET, FILM COATED ORAL
COMMUNITY
Start: 2021-04-20 | End: 2022-12-25

## 2021-08-08 RX ORDER — ACETAMINOPHEN 500 MG/1
TABLET ORAL
COMMUNITY
Start: 2021-02-03 | End: 2022-12-25

## 2021-08-08 RX ORDER — LORATADINE 10 MG/1
10 TABLET ORAL
COMMUNITY
Start: 2021-04-20 | End: 2022-04-09

## 2021-08-08 RX ORDER — KETOROLAC TROMETHAMINE 15 MG/ML
15 INJECTION, SOLUTION INTRAMUSCULAR; INTRAVENOUS ONCE
Status: COMPLETED | OUTPATIENT
Start: 2021-08-08 | End: 2021-08-08

## 2021-08-08 RX ORDER — ONDANSETRON 4 MG/1
4 TABLET, ORALLY DISINTEGRATING ORAL
COMMUNITY
Start: 2021-02-04 | End: 2021-08-09

## 2021-08-08 RX ORDER — HYDROGEN PEROXIDE 2.65 ML/100ML
LIQUID ORAL; TOPICAL
COMMUNITY
Start: 2021-02-05 | End: 2022-12-25

## 2021-08-08 RX ORDER — ONDANSETRON 2 MG/ML
4 INJECTION INTRAMUSCULAR; INTRAVENOUS EVERY 30 MIN PRN
Status: DISCONTINUED | OUTPATIENT
Start: 2021-08-08 | End: 2021-08-09 | Stop reason: HOSPADM

## 2021-08-08 RX ORDER — SODIUM CHLORIDE 9 MG/ML
INJECTION, SOLUTION INTRAVENOUS CONTINUOUS
Status: DISCONTINUED | OUTPATIENT
Start: 2021-08-09 | End: 2021-08-09 | Stop reason: HOSPADM

## 2021-08-08 RX ADMIN — SODIUM CHLORIDE 1000 ML: 9 INJECTION, SOLUTION INTRAVENOUS at 23:20

## 2021-08-08 RX ADMIN — ONDANSETRON 4 MG: 2 INJECTION INTRAMUSCULAR; INTRAVENOUS at 23:22

## 2021-08-08 RX ADMIN — KETOROLAC TROMETHAMINE 15 MG: 15 INJECTION, SOLUTION INTRAMUSCULAR; INTRAVENOUS at 23:24

## 2021-08-09 ENCOUNTER — APPOINTMENT (OUTPATIENT)
Dept: CT IMAGING | Facility: CLINIC | Age: 61
End: 2021-08-09
Attending: FAMILY MEDICINE
Payer: COMMERCIAL

## 2021-08-09 VITALS
RESPIRATION RATE: 18 BRPM | TEMPERATURE: 97.8 F | DIASTOLIC BLOOD PRESSURE: 86 MMHG | BODY MASS INDEX: 20.99 KG/M2 | OXYGEN SATURATION: 93 % | HEART RATE: 93 BPM | SYSTOLIC BLOOD PRESSURE: 124 MMHG | WEIGHT: 97 LBS

## 2021-08-09 LAB
ALBUMIN UR-MCNC: 30 MG/DL
APPEARANCE UR: ABNORMAL
BILIRUB UR QL STRIP: NEGATIVE
COLOR UR AUTO: YELLOW
GLUCOSE UR STRIP-MCNC: NEGATIVE MG/DL
HGB UR QL STRIP: ABNORMAL
HYALINE CASTS: 1 /LPF
KETONES UR STRIP-MCNC: 80 MG/DL
LEUKOCYTE ESTERASE UR QL STRIP: NEGATIVE
MUCOUS THREADS #/AREA URNS LPF: PRESENT /LPF
NITRATE UR QL: NEGATIVE
PH UR STRIP: 5 [PH] (ref 5–7)
RBC URINE: 22 /HPF
SP GR UR STRIP: 1.02 (ref 1–1.03)
SQUAMOUS EPITHELIAL: 4 /HPF
UROBILINOGEN UR STRIP-MCNC: NORMAL MG/DL
WBC URINE: 3 /HPF

## 2021-08-09 PROCEDURE — 250N000009 HC RX 250: Performed by: FAMILY MEDICINE

## 2021-08-09 PROCEDURE — 250N000011 HC RX IP 250 OP 636: Performed by: FAMILY MEDICINE

## 2021-08-09 PROCEDURE — 74177 CT ABD & PELVIS W/CONTRAST: CPT

## 2021-08-09 PROCEDURE — 250N000013 HC RX MED GY IP 250 OP 250 PS 637: Performed by: FAMILY MEDICINE

## 2021-08-09 RX ORDER — OXYCODONE HYDROCHLORIDE 5 MG/1
5-10 TABLET ORAL EVERY 4 HOURS PRN
Qty: 12 TABLET | Refills: 0 | Status: SHIPPED | OUTPATIENT
Start: 2021-08-09 | End: 2021-08-12

## 2021-08-09 RX ORDER — ONDANSETRON 4 MG/1
4 TABLET, ORALLY DISINTEGRATING ORAL EVERY 8 HOURS PRN
Qty: 10 TABLET | Refills: 0 | Status: SHIPPED | OUTPATIENT
Start: 2021-08-09 | End: 2022-12-25

## 2021-08-09 RX ORDER — OXYCODONE HYDROCHLORIDE 5 MG/1
10 TABLET ORAL ONCE
Status: COMPLETED | OUTPATIENT
Start: 2021-08-09 | End: 2021-08-09

## 2021-08-09 RX ORDER — IOPAMIDOL 755 MG/ML
100 INJECTION, SOLUTION INTRAVASCULAR ONCE
Status: COMPLETED | OUTPATIENT
Start: 2021-08-09 | End: 2021-08-09

## 2021-08-09 RX ADMIN — IOPAMIDOL 50 ML: 755 INJECTION, SOLUTION INTRAVENOUS at 00:33

## 2021-08-09 RX ADMIN — SODIUM CHLORIDE 60 ML: 9 INJECTION, SOLUTION INTRAVENOUS at 00:33

## 2021-08-09 RX ADMIN — OXYCODONE HYDROCHLORIDE 10 MG: 5 TABLET ORAL at 01:54

## 2021-08-09 NOTE — ED NOTES
Pt reports abd pain started today about 1000. Spouse states that he gave her a bunch of things for constipation because it had been 2 days since pts last BM. Pt reports she was able to have a BM today about 1400 and pain improved for a couple hours and then pt started up again. Pt reports pain is primarily in the RLQ.

## 2021-08-09 NOTE — ED PROVIDER NOTES
History     Chief Complaint   Patient presents with     Abdominal Pain     HPI  Jemima Yuen is a 60 year old female who presents to the ED tonight with right lower quadrant abdominal pain that started around 10 AM this morning.  Is been persistent all day.  She had no bowel movement in a couple of days so took some MiraLAX and a laxative and finally had some small results around 6 PM.  She has had nausea and vomiting.  Still has her appendix.  Status post cholecystectomy.      Allergies:  Allergies   Allergen Reactions     Codeine Itching     Percocet [Oxycodone-Acetaminophen] GI Disturbance     Vicodin [Hydrocodone-Acetaminophen] GI Disturbance       Problem List:    Patient Active Problem List    Diagnosis Date Noted     Seizure disorder (H) 07/28/2019     Priority: Medium     Sepsis due to Escherichia coli (H) 07/28/2019     Priority: Medium     Pyelonephritis, acute 07/28/2019     Priority: Medium     Mammogram declined 12/03/2014     Priority: Medium     Pap smear of cervix declined 12/03/2014     Priority: Medium     Tobacco use disorder 12/03/2014     Priority: Medium     Ovarian cyst 03/15/2011     Priority: Medium     Convulsions (H)      Priority: Medium     Epilepsy  Problem list name updated by automated process. Provider to review       CARDIOVASCULAR SCREENING; LDL GOAL LESS THAN 160 10/31/2010     Priority: Medium     Hypertension 03/17/2009     Priority: Medium        Past Medical History:    Past Medical History:   Diagnosis Date     Excessive or frequent menstruation 2/01     Headache(784.0)      Hypertension 3/17/2009     Other convulsions      Other unspecified back disorder        Past Surgical History:    Past Surgical History:   Procedure Laterality Date     C LIGATE FALLOPIAN TUBE,POSTPARTUM  1981    Tubal Ligation     COLONOSCOPY  4/1/2011    Procedure:COMBINED COLONOSCOPY, SINGLE BIOPSY/POLYPECTOMY BY BIOPSY; Surgeon:PARRISH PINON; Location: GI      KNEE SCOPE, DIAGNOSTIC   10/95, 2/96    Arthroscopy,  Right Knee x 2     HC REPAIR INTERCARP/CARP-METACARP JT  03/19/08    left wrist       Family History:    Family History   Problem Relation Age of Onset     Neurologic Disorder Mother         epilepsy       Social History:  Marital Status:   [2]  Social History     Tobacco Use     Smoking status: Current Every Day Smoker     Packs/day: 0.25     Last attempt to quit: 2/8/2011     Years since quitting: 10.5     Smokeless tobacco: Never Used     Tobacco comment: no smokers in household   Substance Use Topics     Alcohol use: Yes     Comment: dennis x 1/<1x per yr     Drug use: No     Comment: Marijuana in past/nothing since 1990        Medications:    famotidine (PEPCID) 20 MG tablet  loratadine (CLARITIN) 10 MG tablet  ondansetron (ZOFRAN-ODT) 4 MG ODT tab  oxyCODONE (ROXICODONE) 5 MG tablet  alendronate (FOSAMAX) 70 MG tablet  cholecalciferol 25 MCG (1000 UT) TABS  cyclobenzaprine (FLEXERIL) 10 MG tablet  DEPAKOTE  MG OR TB24  EQ ASPIRIN ADULT LOW DOSE 81 MG EC tablet  EQ PAIN RELIEVER 500 MG tablet          Review of Systems    Physical Exam   BP: 93/73  Pulse: 94  Temp: 97.8  F (36.6  C)  Resp: 18  Weight: 44 kg (97 lb)  SpO2: 97 %      Physical Exam  Constitutional:       General: She is in acute distress (moderate).      Appearance: She is well-developed.   HENT:      Mouth/Throat:      Mouth: Mucous membranes are moist.      Pharynx: Oropharynx is clear.   Cardiovascular:      Rate and Rhythm: Normal rate and regular rhythm.   Pulmonary:      Effort: Pulmonary effort is normal.      Breath sounds: Normal breath sounds.   Abdominal:      Palpations: Abdomen is soft.      Tenderness: There is abdominal tenderness (mild RLQ). There is no guarding or rebound.   Musculoskeletal:         General: Normal range of motion.   Skin:     General: Skin is warm and dry.   Neurological:      General: No focal deficit present.      Mental Status: She is alert and oriented to person,  place, and time.   Psychiatric:         Mood and Affect: Mood normal.         ED Course  (with Medical Decision Making)    60-year-old with right lower quadrant abdominal pain since this morning associated with nausea and vomiting.  White count was normal.  Lactic acid normal.  Her urine shows 22 red cells.  Abdominal CT shows a 3 mm UVJ stone with moderate hydronephrosis.  Renal function is normal.  She feels much better after the IV Toradol and Zofran.  She was given oxycodone orally in hopes of controlling her pain once the Toradol wears off.    We will plan to discharge her home on oxycodone for pain, Zofran ODT for nausea and follow-up in clinic later this week.  Stone is right at her bladder and only 3 mm in diameter so I think we can hold off on Flomax for now.  She knows if she develops a fever she needs to come back immediately.  She will strain her urine and follow-up in clinic with her primary physician this week.  Verbal and written discharge instructions given.  She is comfortable with this plan.              Procedures              Critical Care time:  none               Results for orders placed or performed during the hospital encounter of 08/08/21 (from the past 24 hour(s))   Comprehensive metabolic panel   Result Value Ref Range    Sodium 134 133 - 144 mmol/L    Potassium 4.7 3.4 - 5.3 mmol/L    Chloride 100 94 - 109 mmol/L    Carbon Dioxide (CO2) 21 20 - 32 mmol/L    Anion Gap 13 3 - 14 mmol/L    Urea Nitrogen 16 7 - 30 mg/dL    Creatinine 0.69 0.52 - 1.04 mg/dL    Calcium 9.3 8.5 - 10.1 mg/dL    Glucose 99 70 - 99 mg/dL    Alkaline Phosphatase 62 40 - 150 U/L    AST 16 0 - 45 U/L    ALT 13 0 - 50 U/L    Protein Total 6.9 6.8 - 8.8 g/dL    Albumin 3.4 3.4 - 5.0 g/dL    Bilirubin Total 0.5 0.2 - 1.3 mg/dL    GFR Estimate >90 >60 mL/min/1.73m2   Lipase   Result Value Ref Range    Lipase 51 (L) 73 - 393 U/L   CBC with platelets differential    Narrative    The following orders were created for panel  order CBC with platelets differential.  Procedure                               Abnormality         Status                     ---------                               -----------         ------                     CBC with platelets and d...[[id number]]                      Final result                 Please view results for these tests on the individual orders.   Lactic acid whole blood   Result Value Ref Range    Lactic Acid 1.3 0.7 - 2.0 mmol/L   CBC with platelets and differential   Result Value Ref Range    WBC Count 7.8 4.0 - 11.0 10e3/uL    RBC Count 4.34 3.80 - 5.20 10e6/uL    Hemoglobin 13.8 11.7 - 15.7 g/dL    Hematocrit 40.8 35.0 - 47.0 %    MCV 94 78 - 100 fL    MCH 31.8 26.5 - 33.0 pg    MCHC 33.8 31.5 - 36.5 g/dL    RDW 14.5 10.0 - 15.0 %    Platelet Count 226 150 - 450 10e3/uL    % Neutrophils 79 %    % Lymphocytes 14 %    % Monocytes 7 %    % Eosinophils 0 %    % Basophils 0 %    % Immature Granulocytes 0 %    NRBCs per 100 WBC 0 <1 /100    Absolute Neutrophils 6.2 1.6 - 8.3 10e3/uL    Absolute Lymphocytes 1.1 0.8 - 5.3 10e3/uL    Absolute Monocytes 0.5 0.0 - 1.3 10e3/uL    Absolute Eosinophils 0.0 0.0 - 0.7 10e3/uL    Absolute Basophils 0.0 0.0 - 0.2 10e3/uL    Absolute Immature Granulocytes 0.0 <=0.0 10e3/uL    Absolute NRBCs 0.0 10e3/uL   UA with Microscopic reflex to Culture    Specimen: Urine, Clean Catch   Result Value Ref Range    Color Urine Yellow Colorless, Straw, Light Yellow, Yellow    Appearance Urine Slightly Cloudy (A) Clear    Glucose Urine Negative Negative mg/dL    Bilirubin Urine Negative Negative    Ketones Urine 80  (A) Negative mg/dL    Specific Gravity Urine 1.025 1.003 - 1.035    Blood Urine Large (A) Negative    pH Urine 5.0 5.0 - 7.0    Protein Albumin Urine 30  (A) Negative mg/dL    Urobilinogen Urine Normal Normal, 2.0 mg/dL    Nitrite Urine Negative Negative    Leukocyte Esterase Urine Negative Negative    Mucus Urine Present (A) None Seen /LPF    RBC Urine 22 (H) <=2  /HPF    WBC Urine 3 <=5 /HPF    Squamous Epithelials Urine 4 (H) <=1 /HPF    Hyaline Casts Urine 1 <=2 /LPF    Narrative    Urine Culture not indicated   CT Abdomen Pelvis w Contrast    Narrative    EXAM: CT ABDOMEN PELVIS W CONTRAST  LOCATION: [facility]  DATE/TIME: 8/9/2021 12:20 AM    INDICATION: Right lower quadrant pain and tenderness.  COMPARISON: None.  TECHNIQUE: CT scan of the abdomen and pelvis was performed following injection of IV contrast. Multiplanar reformats were obtained. Dose reduction techniques were used.  CONTRAST: 50 mL Isovue-370    FINDINGS:   LOWER CHEST: Normal.    HEPATOBILIARY: Well-circumscribed homogeneously low-attenuation hepatic lesions likely represent cysts. Cholecystectomy. No biliary dilatation.    PANCREAS: Normal.    SPLEEN: Normal.    ADRENAL GLANDS: Normal.    KIDNEYS/BLADDER: 3 mm obstructing calculus right UVJ. Moderate right ureteral dilatation with periureteral edema. Moderate right-sided hydronephrosis. Delayed contrast enhancement to the right kidney related to the obstruction. Minimal perinephric edema.   Bilateral renal cysts. No other urinary collecting system calculi.    BOWEL: Allowing for collapsed portions of the bowel, there is no evidence for inflammatory change. No bowel obstruction.    LYMPH NODES: No lymphadenopathy.    VASCULATURE: Normal caliber abdominal aorta. Moderate aortic calcification.    PELVIC ORGANS: Unremarkable. No free fluid.    MUSCULOSKELETAL: Moderate degenerative disc disease L4-L5 with central canal narrowing and impression upon the ventral aspect of thecal sac.      Impression    IMPRESSION:   1.  Obstructing 3 mm calculus right UVJ. Moderate right ureteral dilatation and periureteral edema. Moderate right-sided hydronephrosis.    2.  No other obstructing urinary collecting system calculi.    3.  Moderate degenerative disc disease L4-L5 with central canal narrowing and impression of the disc osteophyte  complex upon the ventral aspect of thecal sac.       Medications   ondansetron (ZOFRAN) injection 4 mg (4 mg Intravenous Given 8/8/21 2322)   0.9% sodium chloride BOLUS (0 mLs Intravenous Stopped 8/9/21 0113)     Followed by   sodium chloride 0.9% infusion (has no administration in time range)   ketorolac (TORADOL) injection 15 mg (15 mg Intravenous Given 8/8/21 2324)   sodium chloride (PF) 0.9% PF flush 3 mL (3 mLs Intracatheter Given 8/9/21 0033)   sodium chloride 0.9 % bag 100mL for CT scan flush use (60 mLs Intravenous Given 8/9/21 0033)   iopamidol (ISOVUE-370) solution 100 mL (50 mLs Intravenous Given 8/9/21 0033)   oxyCODONE (ROXICODONE) tablet 10 mg (10 mg Oral Given 8/9/21 0154)       Assessments & Plan     I have reviewed the nursing notes.    I have reviewed the findings, diagnosis, plan and need for follow up with the patient.       New Prescriptions    ONDANSETRON (ZOFRAN-ODT) 4 MG ODT TAB    Take 1 tablet (4 mg) by mouth every 8 hours as needed for nausea    OXYCODONE (ROXICODONE) 5 MG TABLET    Take 1-2 tablets (5-10 mg) by mouth every 4 hours as needed for severe pain       Final diagnoses:   Calculus of ureter - 3mm at UVJ with moderate hydronephrosis       8/8/2021   [facility] EMERGENCY DEPT     Sabino Julien MD  08/09/21 9938

## 2021-08-09 NOTE — DISCHARGE INSTRUCTIONS
Strain your urine.  If you catch a stone, take it to your primary physician when you follow-up in clinic this week. They can set you up to see urology as an outpatient.  Zofran ODT as needed for nausea.  Oxycodone as needed for pain.  Drink plenty of fluids.  Return to the ED if you develop a fever of 100.4, persistent vomiting, pain uncontrolled by oral medications or any concerns.  It was a pleasure visiting with both of you tonight.  I hope this stone passes quickly for you.    Thank you for choosing [facility]. We appreciate the opportunity to meet your urgent medical needs. Please let us know if we could have done anything to make your stay more satisfying.    After discharge, please closely monitor for any new or worsening symptoms. Return to the Emergency Department if you develop any acute worsening signs or symptoms.    If you received an opiate pain medication or sedative during your visit, please do not drive for at least 8 hours.     If you had lab work, cultures or imaging studies done during your stay, the final results may still be pending. We will call you if your plan of care needs to change. However, if you are not improving as expected, please follow up with your primary care provider or clinic.     Start any prescription medications that were prescribed to you and take them as directed.     Please see additional handouts that may be pertinent to your condition.

## 2021-10-23 ENCOUNTER — HEALTH MAINTENANCE LETTER (OUTPATIENT)
Age: 61
End: 2021-10-23

## 2022-01-15 ENCOUNTER — HOSPITAL ENCOUNTER (EMERGENCY)
Facility: CLINIC | Age: 62
Discharge: HOME | End: 2022-01-15
Attending: EMERGENCY MEDICINE | Admitting: EMERGENCY MEDICINE
Payer: MEDICARE

## 2022-01-15 VITALS
TEMPERATURE: 99.1 F | DIASTOLIC BLOOD PRESSURE: 102 MMHG | OXYGEN SATURATION: 98 % | RESPIRATION RATE: 18 BRPM | HEART RATE: 108 BPM | BODY MASS INDEX: 21.64 KG/M2 | WEIGHT: 100 LBS | SYSTOLIC BLOOD PRESSURE: 127 MMHG

## 2022-01-15 DIAGNOSIS — L01.00 IMPETIGO: ICD-10-CM

## 2022-01-15 DIAGNOSIS — T78.40XA ALLERGIC REACTION, INITIAL ENCOUNTER: ICD-10-CM

## 2022-01-15 LAB
ALBUMIN SERPL-MCNC: 2.8 G/DL (ref 3.4–5)
ALP SERPL-CCNC: 53 U/L (ref 40–150)
ALT SERPL W P-5'-P-CCNC: 16 U/L (ref 0–50)
ANION GAP SERPL CALCULATED.3IONS-SCNC: 6 MMOL/L (ref 3–14)
AST SERPL W P-5'-P-CCNC: 21 U/L (ref 0–45)
BASOPHILS # BLD AUTO: 0 10E3/UL (ref 0–0.2)
BASOPHILS NFR BLD AUTO: 0 %
BILIRUB SERPL-MCNC: 0.6 MG/DL (ref 0.2–1.3)
BUN SERPL-MCNC: 20 MG/DL (ref 7–30)
CALCIUM SERPL-MCNC: 9.1 MG/DL (ref 8.5–10.1)
CHLORIDE BLD-SCNC: 104 MMOL/L (ref 94–109)
CO2 SERPL-SCNC: 26 MMOL/L (ref 20–32)
CREAT SERPL-MCNC: 0.68 MG/DL (ref 0.52–1.04)
CRP SERPL-MCNC: 18.9 MG/L (ref 0–8)
EOSINOPHIL # BLD AUTO: 0.2 10E3/UL (ref 0–0.7)
EOSINOPHIL NFR BLD AUTO: 2 %
ERYTHROCYTE [DISTWIDTH] IN BLOOD BY AUTOMATED COUNT: 14.6 % (ref 10–15)
ERYTHROCYTE [SEDIMENTATION RATE] IN BLOOD BY WESTERGREN METHOD: 7 MM/HR (ref 0–30)
GFR SERPL CREATININE-BSD FRML MDRD: >90 ML/MIN/1.73M2
GLUCOSE BLD-MCNC: 74 MG/DL (ref 70–99)
HCT VFR BLD AUTO: 43.1 % (ref 35–47)
HGB BLD-MCNC: 14.6 G/DL (ref 11.7–15.7)
HOLD SPECIMEN: NORMAL
IMM GRANULOCYTES # BLD: 0 10E3/UL
IMM GRANULOCYTES NFR BLD: 0 %
LACTATE SERPL-SCNC: 0.9 MMOL/L (ref 0.7–2)
LYMPHOCYTES # BLD AUTO: 1.2 10E3/UL (ref 0.8–5.3)
LYMPHOCYTES NFR BLD AUTO: 18 %
MCH RBC QN AUTO: 33.9 PG (ref 26.5–33)
MCHC RBC AUTO-ENTMCNC: 33.9 G/DL (ref 31.5–36.5)
MCV RBC AUTO: 100 FL (ref 78–100)
MONOCYTES # BLD AUTO: 0.7 10E3/UL (ref 0–1.3)
MONOCYTES NFR BLD AUTO: 11 %
NEUTROPHILS # BLD AUTO: 4.7 10E3/UL (ref 1.6–8.3)
NEUTROPHILS NFR BLD AUTO: 69 %
NRBC # BLD AUTO: 0 10E3/UL
NRBC BLD AUTO-RTO: 0 /100
PLATELET # BLD AUTO: 189 10E3/UL (ref 150–450)
POTASSIUM BLD-SCNC: 4.2 MMOL/L (ref 3.4–5.3)
PROT SERPL-MCNC: 6.5 G/DL (ref 6.8–8.8)
RBC # BLD AUTO: 4.31 10E6/UL (ref 3.8–5.2)
SODIUM SERPL-SCNC: 136 MMOL/L (ref 133–144)
WBC # BLD AUTO: 6.9 10E3/UL (ref 4–11)

## 2022-01-15 PROCEDURE — 83605 ASSAY OF LACTIC ACID: CPT | Performed by: EMERGENCY MEDICINE

## 2022-01-15 PROCEDURE — 96375 TX/PRO/DX INJ NEW DRUG ADDON: CPT | Performed by: EMERGENCY MEDICINE

## 2022-01-15 PROCEDURE — 99284 EMERGENCY DEPT VISIT MOD MDM: CPT | Mod: 25 | Performed by: EMERGENCY MEDICINE

## 2022-01-15 PROCEDURE — 96361 HYDRATE IV INFUSION ADD-ON: CPT | Performed by: EMERGENCY MEDICINE

## 2022-01-15 PROCEDURE — 36415 COLL VENOUS BLD VENIPUNCTURE: CPT | Performed by: EMERGENCY MEDICINE

## 2022-01-15 PROCEDURE — 80053 COMPREHEN METABOLIC PANEL: CPT | Performed by: EMERGENCY MEDICINE

## 2022-01-15 PROCEDURE — 99284 EMERGENCY DEPT VISIT MOD MDM: CPT | Performed by: EMERGENCY MEDICINE

## 2022-01-15 PROCEDURE — 250N000011 HC RX IP 250 OP 636: Performed by: EMERGENCY MEDICINE

## 2022-01-15 PROCEDURE — 85025 COMPLETE CBC W/AUTO DIFF WBC: CPT | Performed by: EMERGENCY MEDICINE

## 2022-01-15 PROCEDURE — 96374 THER/PROPH/DIAG INJ IV PUSH: CPT | Performed by: EMERGENCY MEDICINE

## 2022-01-15 PROCEDURE — 82040 ASSAY OF SERUM ALBUMIN: CPT | Performed by: EMERGENCY MEDICINE

## 2022-01-15 PROCEDURE — 86140 C-REACTIVE PROTEIN: CPT | Performed by: EMERGENCY MEDICINE

## 2022-01-15 PROCEDURE — 85652 RBC SED RATE AUTOMATED: CPT | Performed by: EMERGENCY MEDICINE

## 2022-01-15 PROCEDURE — 258N000003 HC RX IP 258 OP 636: Performed by: EMERGENCY MEDICINE

## 2022-01-15 PROCEDURE — 250N000009 HC RX 250: Performed by: EMERGENCY MEDICINE

## 2022-01-15 RX ORDER — LORATADINE 10 MG/1
10 TABLET ORAL DAILY
Qty: 5 TABLET | Refills: 0 | Status: SHIPPED | OUTPATIENT
Start: 2022-01-15 | End: 2022-01-20

## 2022-01-15 RX ORDER — PREDNISONE 20 MG/1
TABLET ORAL
Qty: 10 TABLET | Refills: 0 | Status: SHIPPED | OUTPATIENT
Start: 2022-01-15 | End: 2022-04-09

## 2022-01-15 RX ORDER — DIPHENHYDRAMINE HYDROCHLORIDE 50 MG/ML
25 INJECTION INTRAMUSCULAR; INTRAVENOUS ONCE
Status: COMPLETED | OUTPATIENT
Start: 2022-01-15 | End: 2022-01-15

## 2022-01-15 RX ORDER — SODIUM CHLORIDE 9 MG/ML
INJECTION, SOLUTION INTRAVENOUS CONTINUOUS
Status: DISCONTINUED | OUTPATIENT
Start: 2022-01-15 | End: 2022-01-15 | Stop reason: HOSPADM

## 2022-01-15 RX ORDER — METHYLPREDNISOLONE SODIUM SUCCINATE 125 MG/2ML
125 INJECTION, POWDER, LYOPHILIZED, FOR SOLUTION INTRAMUSCULAR; INTRAVENOUS ONCE
Status: COMPLETED | OUTPATIENT
Start: 2022-01-15 | End: 2022-01-15

## 2022-01-15 RX ORDER — CEPHALEXIN 500 MG/1
500 CAPSULE ORAL 4 TIMES DAILY
Qty: 28 CAPSULE | Refills: 0 | Status: SHIPPED | OUTPATIENT
Start: 2022-01-15 | End: 2022-01-22

## 2022-01-15 RX ORDER — FAMOTIDINE 20 MG/1
20 TABLET, FILM COATED ORAL 2 TIMES DAILY
Qty: 10 TABLET | Refills: 0 | Status: ON HOLD | OUTPATIENT
Start: 2022-01-15 | End: 2022-12-26

## 2022-01-15 RX ADMIN — FAMOTIDINE 40 MG: 10 INJECTION, SOLUTION INTRAVENOUS at 14:30

## 2022-01-15 RX ADMIN — DIPHENHYDRAMINE HYDROCHLORIDE 25 MG: 50 INJECTION, SOLUTION INTRAMUSCULAR; INTRAVENOUS at 14:29

## 2022-01-15 RX ADMIN — SODIUM CHLORIDE 1000 ML: 9 INJECTION, SOLUTION INTRAVENOUS at 14:27

## 2022-01-15 RX ADMIN — METHYLPREDNISOLONE SODIUM SUCCINATE 125 MG: 125 INJECTION, POWDER, FOR SOLUTION INTRAMUSCULAR; INTRAVENOUS at 14:30

## 2022-01-15 NOTE — ED PROVIDER NOTES
History     Chief Complaint   Patient presents with     Facial Swelling     HPI  Jemima Yuen is a 61 year old female who presents to the emergency room for facial swelling.  Symptoms started last night.  She does not know what may have triggered this.  Has had similar reaction in the past, was still unaware of what triggered it.  Has no known food allergies.  Has several medication allergies but has not been prescribed anything that may have caused this reaction.  She tried taking Benadryl last night and again early this morning but it has not helped.  Has not been running a fever.  Does not have any difficulty with breathing.  States that the rash is itchy and slightly painful.    Allergies:  Allergies   Allergen Reactions     Codeine Itching     Percocet [Oxycodone-Acetaminophen] GI Disturbance     Vicodin [Hydrocodone-Acetaminophen] GI Disturbance       Problem List:    Patient Active Problem List    Diagnosis Date Noted     Seizure disorder (H) 07/28/2019     Priority: Medium     Sepsis due to Escherichia coli (H) 07/28/2019     Priority: Medium     Pyelonephritis, acute 07/28/2019     Priority: Medium     Mammogram declined 12/03/2014     Priority: Medium     Pap smear of cervix declined 12/03/2014     Priority: Medium     Tobacco use disorder 12/03/2014     Priority: Medium     Ovarian cyst 03/15/2011     Priority: Medium     Convulsions (H)      Priority: Medium     Epilepsy  Problem list name updated by automated process. Provider to review       CARDIOVASCULAR SCREENING; LDL GOAL LESS THAN 160 10/31/2010     Priority: Medium     Hypertension 03/17/2009     Priority: Medium        Past Medical History:    Past Medical History:   Diagnosis Date     Excessive or frequent menstruation 2/01     Headache(784.0)      Hypertension 3/17/2009     Other convulsions      Other unspecified back disorder        Past Surgical History:    Past Surgical History:   Procedure Laterality Date     COLONOSCOPY  4/1/2011     Procedure:COMBINED COLONOSCOPY, SINGLE BIOPSY/POLYPECTOMY BY BIOPSY; Surgeon:PARRISH PINON; Location:[facility]     HC KNEE SCOPE, DIAGNOSTIC  10/95, 2/96    Arthroscopy,  Right Knee x 2     HC REPAIR INTERCARP/CARP-METACARP JT  03/19/08    left wrist     ZZC LIGATE FALLOPIAN TUBE,POSTPARTUM  1981    Tubal Ligation       Family History:    Family History   Problem Relation Age of Onset     Neurologic Disorder Mother         epilepsy       Social History:  Marital Status:   [2]  Social History     Tobacco Use     Smoking status: Current Every Day Smoker     Packs/day: 0.25     Last attempt to quit: 2/8/2011     Years since quitting: 10.9     Smokeless tobacco: Never Used     Tobacco comment: no smokers in household   Substance Use Topics     Alcohol use: Yes     Comment: dennis x 1/<1x per yr     Drug use: No     Comment: Marijuana in past/nothing since 1990        Medications:    cephALEXin (KEFLEX) 500 MG capsule  famotidine (PEPCID) 20 MG tablet  loratadine (CLARITIN) 10 MG tablet  predniSONE (DELTASONE) 20 MG tablet  alendronate (FOSAMAX) 70 MG tablet  cholecalciferol 25 MCG (1000 UT) TABS  cyclobenzaprine (FLEXERIL) 10 MG tablet  DEPAKOTE  MG OR TB24  EQ ASPIRIN ADULT LOW DOSE 81 MG EC tablet  EQ PAIN RELIEVER 500 MG tablet  famotidine (PEPCID) 20 MG tablet  loratadine (CLARITIN) 10 MG tablet  ondansetron (ZOFRAN-ODT) 4 MG ODT tab          Review of Systems   All other systems reviewed and are negative.      Physical Exam   BP: (!) 130/100  Pulse: 119  Temp: 99.1  F (37.3  C)  Resp: 18  Weight: 45.4 kg (100 lb)  SpO2: 98 %      Physical Exam  Vitals and nursing note reviewed.   Constitutional:       Appearance: She is not diaphoretic.   HENT:      Head: Atraumatic.      Right Ear: Drainage and swelling present. A middle ear effusion is present. Tympanic membrane is erythematous.      Left Ear: Tympanic membrane normal.      Mouth/Throat:      Mouth: No angioedema.      Pharynx: No oropharyngeal  exudate or uvula swelling.   Eyes:      General: No scleral icterus.     Pupils: Pupils are equal, round, and reactive to light.   Cardiovascular:      Heart sounds: Normal heart sounds.   Pulmonary:      Effort: No respiratory distress.      Breath sounds: Normal breath sounds.   Abdominal:      General: Bowel sounds are normal.      Palpations: Abdomen is soft.      Tenderness: There is no abdominal tenderness.   Musculoskeletal:         General: No tenderness.   Skin:     General: Skin is warm.      Comments: See photo below   Neurological:      Mental Status: She is alert.                  ED Course                 Procedures              Critical Care time:  none               Results for orders placed or performed during the hospital encounter of 01/15/22 (from the past 24 hour(s))   CBC with platelets differential    Narrative    The following orders were created for panel order CBC with platelets differential.  Procedure                               Abnormality         Status                     ---------                               -----------         ------                     CBC with platelets and d...[[id number]]  Abnormal            Final result                 Please view results for these tests on the individual orders.   Comprehensive metabolic panel   Result Value Ref Range    Sodium 136 133 - 144 mmol/L    Potassium 4.2 3.4 - 5.3 mmol/L    Chloride 104 94 - 109 mmol/L    Carbon Dioxide (CO2) 26 20 - 32 mmol/L    Anion Gap 6 3 - 14 mmol/L    Urea Nitrogen 20 7 - 30 mg/dL    Creatinine 0.68 0.52 - 1.04 mg/dL    Calcium 9.1 8.5 - 10.1 mg/dL    Glucose 74 70 - 99 mg/dL    Alkaline Phosphatase 53 40 - 150 U/L    AST 21 0 - 45 U/L    ALT 16 0 - 50 U/L    Protein Total 6.5 (L) 6.8 - 8.8 g/dL    Albumin 2.8 (L) 3.4 - 5.0 g/dL    Bilirubin Total 0.6 0.2 - 1.3 mg/dL    GFR Estimate >90 >60 mL/min/1.73m2   CRP inflammation   Result Value Ref Range    CRP Inflammation 18.9 (H) 0.0 - 8.0 mg/L   Erythrocyte  sedimentation rate auto   Result Value Ref Range    Erythrocyte Sedimentation Rate 7 0 - 30 mm/hr   Lactic acid whole blood   Result Value Ref Range    Lactic Acid 0.9 0.7 - 2.0 mmol/L   CBC with platelets and differential   Result Value Ref Range    WBC Count 6.9 4.0 - 11.0 10e3/uL    RBC Count 4.31 3.80 - 5.20 10e6/uL    Hemoglobin 14.6 11.7 - 15.7 g/dL    Hematocrit 43.1 35.0 - 47.0 %     78 - 100 fL    MCH 33.9 (H) 26.5 - 33.0 pg    MCHC 33.9 31.5 - 36.5 g/dL    RDW 14.6 10.0 - 15.0 %    Platelet Count 189 150 - 450 10e3/uL    % Neutrophils 69 %    % Lymphocytes 18 %    % Monocytes 11 %    % Eosinophils 2 %    % Basophils 0 %    % Immature Granulocytes 0 %    NRBCs per 100 WBC 0 <1 /100    Absolute Neutrophils 4.7 1.6 - 8.3 10e3/uL    Absolute Lymphocytes 1.2 0.8 - 5.3 10e3/uL    Absolute Monocytes 0.7 0.0 - 1.3 10e3/uL    Absolute Eosinophils 0.2 0.0 - 0.7 10e3/uL    Absolute Basophils 0.0 0.0 - 0.2 10e3/uL    Absolute Immature Granulocytes 0.0 <=0.4 10e3/uL    Absolute NRBCs 0.0 10e3/uL   Extra Tube    Narrative    The following orders were created for panel order Extra Tube.  Procedure                               Abnormality         Status                     ---------                               -----------         ------                     Extra Blue Top Tube[[id number]]                              Final result                 Please view results for these tests on the individual orders.   Extra Blue Top Tube   Result Value Ref Range    Hold Specimen JIC        Medications   0.9% sodium chloride BOLUS (1,000 mLs Intravenous New Bag 1/15/22 1427)     Followed by   sodium chloride 0.9% infusion (has no administration in time range)   methylPREDNISolone sodium succinate (solu-MEDROL) injection 125 mg (125 mg Intravenous Given 1/15/22 1430)   diphenhydrAMINE (BENADRYL) injection 25 mg (25 mg Intravenous Given 1/15/22 1429)   famotidine (PEPCID) injection 40 mg (40 mg Intravenous Given 1/15/22 1430)        Assessments & Plan (with Medical Decision Making)  Angela is a 61-year-old female who presents to the emergency room for concerns of allergic reaction.  See history and focused physical exam as above  61-year-old female in no acute respiratory distress, is afebrile but hypertensive.  See photo above, has bilateral periorbital swelling, mucoid discharge from her eyes, underlying conjunctiva are not red or irritated.  Does have yellow crusted lesions along the center of her face, concern for impetigo.  No known possible allergic contact.  We will treat with Benadryl, Solu-Medrol, and Pepcid to help control allergic reaction.  We will also get some blood work.  She is agreeable to this plan  Labs and imaging as above.  Does have a slightly elevated CRP but sed rate is within normal limits.  Remainder of labs unremarkable.  No angioedema, no worsening of her swelling, no progression to swelling of lips or tongue while here in the ED.  We will treat for both an allergic reaction but also treat for impetigo with an antibiotic.  We will give oral antibiotics since it is extensive and not localized to one small area of the face that would be amenable to topical antibiotic application.  Recommend close follow-up with her primary provider.  Return to the emergency room if symptoms worsen.  Discharged in no distress     I have reviewed the nursing notes.    I have reviewed the findings, diagnosis, plan and need for follow up with the patient.       New Prescriptions    CEPHALEXIN (KEFLEX) 500 MG CAPSULE    Take 1 capsule (500 mg) by mouth 4 times daily for 7 days    FAMOTIDINE (PEPCID) 20 MG TABLET    Take 1 tablet (20 mg) by mouth 2 times daily    LORATADINE (CLARITIN) 10 MG TABLET    Take 1 tablet (10 mg) by mouth daily for 5 days    PREDNISONE (DELTASONE) 20 MG TABLET    Take two tablets (= 40mg) each day for 5 (five) days       Final diagnoses:   Allergic reaction, initial encounter   Impetigo       1/15/2022   M  [facility] EMERGENCY DEPT     Gem Nguyễn DO  01/15/22 8017

## 2022-01-15 NOTE — DISCHARGE INSTRUCTIONS
Your lab work showed a slight elevation in an inflammatory marker in your blood, this is consistent with an inflammatory reaction such as an allergic reaction.  No signs of systemic infection, such as sepsis, were detected    To treat your allergic reaction, you were given a steroid, Pepcid, and Benadryl in the emergency room today.  Prescriptions for Claritin and Pepcid were sent to the pharmacy.  Claritin should be taken once daily for the next 5 days.  Pepcid should be taken twice daily for the next 5 days.  You may start with a dose this evening.    A steroid was sent to the pharmacy to help with inflammation and allergic reaction.  You were given a dose today and may start the prescription tomorrow try taking this early in the morning as possible since steroids can make you feel wound up and have difficulty sleeping    The yellow crusting lesions on your face were concerning for impetigo, which is a bacterial infection.  You were given an oral antibiotic to help with this since it is so widespread.  You should start the antibiotic today, and will need to take it 4 times daily for 1 week.  Take this complete course even if you begin to feel better    Follow-up with your primary provider to make sure that symptoms are improving.  They may need to refer you to an allergist for further testing to determine what is causing this intermittent allergic reaction response    If you have swelling of your lips or tongue, difficulty breathing, or have any new concerns, do not hesitate to return to the emergency room for evaluation

## 2022-01-15 NOTE — ED TRIAGE NOTES
Facial swelling since last night.  Denies trouble breathing.  States she has had an allergic reaction like this before, but this is worse.  Has taken Benadryl today at 0830.

## 2022-01-20 ENCOUNTER — NURSE TRIAGE (OUTPATIENT)
Dept: NURSING | Facility: CLINIC | Age: 62
End: 2022-01-20
Payer: MEDICARE

## 2022-01-21 NOTE — TELEPHONE ENCOUNTER
"  S-(situation): Call from patient    Seen for facial swelling;   Has pain 7/10 and peeling - started yesterday; pain wore today      B-(background): Seen on 1/15/22 in ED      A-(assessment): needs to be evaluated      R-(recommendations): Advised be seen within 24 hrs    Reviewed care advice with caller per RN triage protocol guideline.  Advised to call back with worsening symptoms, concerns or questions.   Caller verbalized understanding but says she has an appointment with PCP on Monday. Patient does not want to see anyone else.          Flaquita Vela RN/[facility]    Reason for Disposition    Face pain present > 24 hours    Additional Information    Negative: Shock suspected (e.g., cold/pale/clammy skin, too weak to stand, low BP, rapid pulse)    Negative: [1] Similar pain previously AND [2] it was from \"heart attack\"    Negative: [1] Similar pain previously AND [2] it was from \"angina\" AND [3] not relieved by nitroglycerin    Negative: Sounds like a life-threatening emergency to the triager    Negative: Difficulty breathing or unusual sweating (e.g., sweating without exertion)    Negative: Can't close the mouth fully (i.e., \"mouth is locked open\", patient will have difficulty talking)    Negative: [1] Fever AND [2] localized red rash    Negative: [1] Fever AND [2] area of face is swollen    Negative: [1] New onset jaw pain AND [2] unknown cause AND [3] at least one cardiac risk factor (i.e., hypertension, diabetes, obesity, smoker or strong family history of heart disease)    Negative: Patient sounds very sick or weak to the triager    Negative: [1] SEVERE pain (e.g., excruciating) AND [2] not improved after 2 hours of pain medicine    Negative: [1] Localized red rash AND [2] painful to the touch    Negative: [1] Painful rash AND [2] multiple small blisters grouped together (i.e., dermatomal distribution or \"band\" or \"stripe\" on one side of face)    Negative: [1] Swollen area of face AND [2] " toothache    Negative: [1] Swollen area of face AND [2] is painful to touch    Negative: Swelling around the eye    Protocols used: FACE PAIN-A-AH

## 2022-02-12 ENCOUNTER — HEALTH MAINTENANCE LETTER (OUTPATIENT)
Age: 62
End: 2022-02-12

## 2022-04-09 ENCOUNTER — HOSPITAL ENCOUNTER (EMERGENCY)
Facility: CLINIC | Age: 62
Discharge: HOME | End: 2022-04-09
Attending: EMERGENCY MEDICINE | Admitting: EMERGENCY MEDICINE
Payer: MEDICARE

## 2022-04-09 ENCOUNTER — HEALTH MAINTENANCE LETTER (OUTPATIENT)
Age: 62
End: 2022-04-09

## 2022-04-09 ENCOUNTER — APPOINTMENT (OUTPATIENT)
Dept: GENERAL RADIOLOGY | Facility: CLINIC | Age: 62
End: 2022-04-09
Attending: EMERGENCY MEDICINE
Payer: MEDICARE

## 2022-04-09 VITALS
DIASTOLIC BLOOD PRESSURE: 91 MMHG | SYSTOLIC BLOOD PRESSURE: 120 MMHG | RESPIRATION RATE: 18 BRPM | HEART RATE: 103 BPM | TEMPERATURE: 97.9 F | OXYGEN SATURATION: 96 %

## 2022-04-09 DIAGNOSIS — L23.9 ALLERGIC CONTACT DERMATITIS, UNSPECIFIED TRIGGER: ICD-10-CM

## 2022-04-09 DIAGNOSIS — R21 FACIAL RASH: ICD-10-CM

## 2022-04-09 DIAGNOSIS — J20.9 ACUTE BRONCHITIS WITH COEXISTING CONDITION REQUIRING PROPHYLACTIC TREATMENT: ICD-10-CM

## 2022-04-09 LAB
FLUAV RNA SPEC QL NAA+PROBE: NEGATIVE
FLUBV RNA RESP QL NAA+PROBE: NEGATIVE
SARS-COV-2 RNA RESP QL NAA+PROBE: NEGATIVE

## 2022-04-09 PROCEDURE — C9803 HOPD COVID-19 SPEC COLLECT: HCPCS | Performed by: EMERGENCY MEDICINE

## 2022-04-09 PROCEDURE — 87636 SARSCOV2 & INF A&B AMP PRB: CPT | Performed by: EMERGENCY MEDICINE

## 2022-04-09 PROCEDURE — 250N000011 HC RX IP 250 OP 636: Performed by: EMERGENCY MEDICINE

## 2022-04-09 PROCEDURE — 250N000013 HC RX MED GY IP 250 OP 250 PS 637: Performed by: EMERGENCY MEDICINE

## 2022-04-09 PROCEDURE — 71045 X-RAY EXAM CHEST 1 VIEW: CPT

## 2022-04-09 PROCEDURE — 99284 EMERGENCY DEPT VISIT MOD MDM: CPT | Mod: 25 | Performed by: EMERGENCY MEDICINE

## 2022-04-09 PROCEDURE — 99284 EMERGENCY DEPT VISIT MOD MDM: CPT | Mod: CS | Performed by: EMERGENCY MEDICINE

## 2022-04-09 RX ORDER — LORATADINE 10 MG/1
10 TABLET ORAL DAILY PRN
Qty: 10 TABLET | Refills: 0 | Status: SHIPPED | OUTPATIENT
Start: 2022-04-09

## 2022-04-09 RX ORDER — TACROLIMUS 1 MG/G
OINTMENT TOPICAL
Status: ON HOLD | COMMUNITY
Start: 2022-02-09 | End: 2022-12-26

## 2022-04-09 RX ORDER — CETIRIZINE HYDROCHLORIDE 10 MG/1
10 TABLET ORAL ONCE
Status: COMPLETED | OUTPATIENT
Start: 2022-04-09 | End: 2022-04-09

## 2022-04-09 RX ORDER — FAMOTIDINE 20 MG/1
20 TABLET, FILM COATED ORAL ONCE
Status: COMPLETED | OUTPATIENT
Start: 2022-04-09 | End: 2022-04-09

## 2022-04-09 RX ORDER — TRIAMCINOLONE ACETONIDE 1 MG/G
OINTMENT TOPICAL
Status: ON HOLD | COMMUNITY
Start: 2022-02-09 | End: 2022-12-26

## 2022-04-09 RX ORDER — PREDNISONE 20 MG/1
TABLET ORAL
Qty: 10 TABLET | Refills: 0 | Status: ON HOLD | OUTPATIENT
Start: 2022-04-09 | End: 2022-12-26

## 2022-04-09 RX ORDER — BENZONATATE 200 MG/1
200 CAPSULE ORAL 3 TIMES DAILY PRN
Qty: 30 CAPSULE | Refills: 0 | Status: ON HOLD | OUTPATIENT
Start: 2022-04-09 | End: 2022-12-26

## 2022-04-09 RX ORDER — CEPHALEXIN 500 MG/1
500 CAPSULE ORAL 3 TIMES DAILY
Qty: 21 CAPSULE | Refills: 0 | Status: SHIPPED | OUTPATIENT
Start: 2022-04-09 | End: 2022-04-16

## 2022-04-09 RX ADMIN — DEXAMETHASONE 10 MG: 2 TABLET ORAL at 10:44

## 2022-04-09 RX ADMIN — CETIRIZINE HYDROCHLORIDE 10 MG: 10 TABLET, FILM COATED ORAL at 10:44

## 2022-04-09 RX ADMIN — FAMOTIDINE 20 MG: 20 TABLET ORAL at 10:44

## 2022-04-09 NOTE — ED PROVIDER NOTES
History     Chief Complaint   Patient presents with     Rash     HPI  History per patient, medical records    This is a 61-year-old female, history of hypertension, seizures, tobacco use disorder presenting with rash.  Patient woke this morning with a red itchy rash over her entire face.  She has had a facial rash in the past and used tacrolimus.  She removed all of the scented detergents, soaps and lotions for her home and has been fine until she woke this morning.  She placed tacrolimus over the rash this morning.  She notes a little bit of itchiness and rash on her fingers of her left hand and some on her right hand.  She has a steroid cream to use for this.  Patient had a sore throat that started a couple of days ago and developed a productive cough, worse when lying down.  She was at a baby shower for her granddaughter just prior to onset of symptoms.  No obvious or known Covid exposures.  She is COVID immunized, no booster.  No history of Covid infection.  She slept propped up on the sofa last night to help decrease cough symptoms because she could not sleep flat due to the cough.  She denies any fevers or chills, nausea, vomiting or diarrhea, urinary symptoms.    Review of medical record shows that patient was seen in this ED on 1/15/2022 with similar symptoms.  She was placed on steroids, Pepcid, Claritin, Keflex as it appeared there was some impetigo.  She followed up with dermatology and it was thought her symptoms were contact dermatitis related.    Allergies:  Allergies   Allergen Reactions     Codeine Itching     Percocet [Oxycodone-Acetaminophen] GI Disturbance     Vicodin [Hydrocodone-Acetaminophen] GI Disturbance       Problem List:    Patient Active Problem List    Diagnosis Date Noted     Seizure disorder (H) 07/28/2019     Priority: Medium     Sepsis due to Escherichia coli (H) 07/28/2019     Priority: Medium     Pyelonephritis, acute 07/28/2019     Priority: Medium     Mammogram declined  2014     Priority: Medium     Pap smear of cervix declined 2014     Priority: Medium     Tobacco use disorder 2014     Priority: Medium     Ovarian cyst 03/15/2011     Priority: Medium     Convulsions (H)      Priority: Medium     Epilepsy  Problem list name updated by automated process. Provider to review       CARDIOVASCULAR SCREENING; LDL GOAL LESS THAN 160 10/31/2010     Priority: Medium     Hypertension 2009     Priority: Medium        Past Medical History:    Past Medical History:   Diagnosis Date     Excessive or frequent menstruation      Headache(784.0)      Hypertension 3/17/2009     Other convulsions      Other unspecified back disorder        Past Surgical History:    Past Surgical History:   Procedure Laterality Date     COLONOSCOPY  2011    Procedure:COMBINED COLONOSCOPY, SINGLE BIOPSY/POLYPECTOMY BY BIOPSY; Surgeon:PARRISH PINON; Location:[facility]     HC KNEE SCOPE, DIAGNOSTIC  10/95,     Arthroscopy,  Right Knee x 2     HC REPAIR INTERCARP/CARP-METACARP JT  08    left wrist     ZZC LIGATE FALLOPIAN TUBE,POSTPARTUM  1981    Tubal Ligation       Family History:    Family History   Problem Relation Age of Onset     Neurologic Disorder Mother         epilepsy       Social History:  Marital Status:   [2]  Social History     Tobacco Use     Smoking status: Current Every Day Smoker     Packs/day: 0.25     Last attempt to quit: 2011     Years since quittin.1     Smokeless tobacco: Never Used     Tobacco comment: no smokers in household   Substance Use Topics     Alcohol use: Yes     Comment: dennis x 1/<1x per yr     Drug use: No     Comment: Marijuana in past/nothing since         Medications:    benzonatate (TESSALON) 200 MG capsule  cephALEXin (KEFLEX) 500 MG capsule  loratadine (CLARITIN) 10 MG tablet  predniSONE (DELTASONE) 20 MG tablet  tacrolimus (PROTOPIC) 0.1 % external ointment  triamcinolone (KENALOG) 0.1 % external  ointment  alendronate (FOSAMAX) 70 MG tablet  cholecalciferol 25 MCG (1000 UT) TABS  cyclobenzaprine (FLEXERIL) 10 MG tablet  DEPAKOTE  MG OR TB24  EQ ASPIRIN ADULT LOW DOSE 81 MG EC tablet  EQ PAIN RELIEVER 500 MG tablet  famotidine (PEPCID) 20 MG tablet  famotidine (PEPCID) 20 MG tablet  ondansetron (ZOFRAN-ODT) 4 MG ODT tab          Review of Systems   All other ROS reviewed and are negative or non-contributory except as stated in HPI.     Physical Exam   BP: (!) 149/96  Pulse: 111  Temp: 97.9  F (36.6  C)  Resp: 18  SpO2: 96 %      Physical Exam  Vitals and nursing note reviewed.   Constitutional:       Comments: Small, older appearing female sitting in a chair.  Significant facial rash noted   HENT:      Nose: Nose normal.      Mouth/Throat:      Mouth: Mucous membranes are moist.      Pharynx: Oropharynx is clear.   Eyes:      Extraocular Movements: Extraocular movements intact.      Conjunctiva/sclera: Conjunctivae normal.   Cardiovascular:      Rate and Rhythm: Regular rhythm. Tachycardia present.      Pulses: Normal pulses.      Heart sounds: Normal heart sounds.   Pulmonary:      Effort: Pulmonary effort is normal.      Breath sounds: Normal breath sounds.   Musculoskeletal:         General: No swelling. Normal range of motion.      Cervical back: Normal range of motion and neck supple.      Right lower leg: No edema.      Left lower leg: No edema.      Comments: Patches of dry mildly erythematous skin on the right dorsum of the hand and a little bit on the left index and middle fingers   Skin:     Comments: Significant erythema, weeping of the face starting at the brows down past the chin.  Please see pictures.   Neurological:      General: No focal deficit present.      Mental Status: She is alert and oriented to person, place, and time.   Psychiatric:         Mood and Affect: Mood normal.         Behavior: Behavior normal.             ED Course (with Medical Decision Making)    Pt seen and  examined by me.  RN and EPIC notes reviewed.       Patient with significant facial rash.  She also has cough symptoms.  Rash occurred overnight.  It does look like a contact dermatitis.  She was given Decadron, Pepcid, Zyrtec.  We discussed and a Covid/influenza swab was done and chest x-ray also done.    Patient had mild improvement in symptoms with the below regimen.  Covid/influenza swab negative.  Chest x-ray clear.    Since she is a smoker and has such a loose cough I am going to give her antibiotics for bronchitis.  Tessalon Perles for cough.  Antibiotics hopefully will also cover any developing skin infection.  She was given a prednisone burst.  Continue Claritin if needed for itch and okay to continue Protopic.    She has been recommended/referred patch testing and I recommend she follow-up with this.  If she has any significant worsening, changes or concerns return promptly at any time.         Procedures    Results for orders placed or performed during the hospital encounter of 04/09/22 (from the past 24 hour(s))   Symptomatic; Yes; 4/7/2022 Influenza A/B & SARS-CoV2 (COVID-19) Virus PCR Multiplex Nose    Specimen: Nose; Swab   Result Value Ref Range    Influenza A PCR Negative Negative    Influenza B PCR Negative Negative    SARS CoV2 PCR Negative Negative    Narrative    Testing was performed using the chapo SARS-CoV-2 & Influenza A/B Assay on the chapo Rina System. This test should be ordered for the detection of SARS-CoV-2 and influenza viruses in individuals who meet clinical and/or epidemiological criteria. Test performance is unknown in asymptomatic patients. This test is for in vitro diagnostic use under the FDA EUA for laboratories certified under CLIA to perform moderate and/or high complexity testing. This test has not been FDA cleared or approved. A negative result does not rule out the presence of PCR inhibitors in the specimen or target RNA in concentration below the limit of detection for  the assay. If only one viral target is positive but coinfection with multiple targets is suspected, the sample should be re-tested with another FDA cleared, approved or authorized test, if coinfection would change clinical management. [facility] are certified under the Clinical Laboratory Improvement Amendments of 1988 (CLIA-88) as  qualified to perform moderate and/or high complexity laboratory testing.   XR Chest Port 1 View    Narrative    EXAM: XR CHEST PORTABLE 1 VIEW  LOCATION: [facility]  DATE/TIME: 04/09/2022, 10:39 AM    INDICATION: Cough.  COMPARISON: None.      Impression    IMPRESSION: Aortic calcification. Chest otherwise negative. Lungs clear. Surgical clips right upper quadrant.         Medications   cetirizine (zyrTEC) tablet 10 mg (10 mg Oral Given 4/9/22 1044)   dexamethasone (DECADRON) tablet 10 mg (10 mg Oral Given 4/9/22 1044)   famotidine (PEPCID) tablet 20 mg (20 mg Oral Given 4/9/22 1044)       Assessments & Plan     I have reviewed the findings, diagnosis, plan and need for follow up with the patient.    Discharge Medication List as of 4/9/2022 11:30 AM      START taking these medications    Details   benzonatate (TESSALON) 200 MG capsule Take 1 capsule (200 mg) by mouth 3 times daily as needed for cough, Disp-30 capsule, R-0, E-Prescribe      cephALEXin (KEFLEX) 500 MG capsule Take 1 capsule (500 mg) by mouth 3 times daily for 7 days, Disp-21 capsule, R-0, E-Prescribe             Final diagnoses:   Allergic contact dermatitis, unspecified trigger   Facial rash   Acute bronchitis with coexisting condition requiring prophylactic treatment     Disposition: Patient discharged home in stable condition.  Plan as above.  Return for concerns.     Note: Chart documentation done in part with Dragon Voice Recognition software. Although reviewed after completion, some word and grammatical errors may remain.       4/9/2022   [facility]  EMERGENCY DEPT     Cammie Sánchez MD  04/10/22 0157

## 2022-04-09 NOTE — ED TRIAGE NOTES
Pt presents with concerns of a facial rash.  Pt was prescribed Triamcinolon 0.1% and Tacrolimus 0.1%.  Pt states that she has been using the lotion since previously.  Rash started this morning again.  Pt also concerned that she has a cough.

## 2022-04-09 NOTE — DISCHARGE INSTRUCTIONS
Start antibiotics as prescribed.  I recommend eating yogurt or taking probiotics while on antibiotics.    Tessalon Perles as needed for cough.    Start prednisone tomorrow.  You were given 1 dose of steroid in the ED.  Prednisone is a steroid to decrease the inflammation and itch.  Take with food or milk.    Start Claritin tomorrow as needed for itch or allergic reaction symptoms.    I am not sure exactly the cause of your allergic reaction.  I know they were considering doing skin testing and if you continue to have flareups this would be a good idea to consider.    Continue the tacrolimus facial ointment as needed for symptoms on the face.    Your chest x-ray was clear.  I believe you have bronchitis.  Your Covid and influenza swab was negative.    Return at anytime for significant worsening, changes or concerns.    I hope that you improve very quickly!!!

## 2022-10-09 ENCOUNTER — HEALTH MAINTENANCE LETTER (OUTPATIENT)
Age: 62
End: 2022-10-09

## 2022-11-22 ENCOUNTER — TRANSFERRED RECORDS (OUTPATIENT)
Dept: HEALTH INFORMATION MANAGEMENT | Facility: CLINIC | Age: 62
End: 2022-11-22

## 2022-12-25 ENCOUNTER — HOSPITAL ENCOUNTER (INPATIENT)
Facility: CLINIC | Age: 62
LOS: 3 days | Discharge: NURSING, REHAB OR LONG-TERM CARE | DRG: 200 | End: 2022-12-28
Attending: EMERGENCY MEDICINE | Admitting: SPECIALIST
Payer: MEDICARE

## 2022-12-25 ENCOUNTER — APPOINTMENT (OUTPATIENT)
Dept: CT IMAGING | Facility: CLINIC | Age: 62
DRG: 200 | End: 2022-12-25
Attending: EMERGENCY MEDICINE
Payer: MEDICARE

## 2022-12-25 ENCOUNTER — APPOINTMENT (OUTPATIENT)
Dept: GENERAL RADIOLOGY | Facility: CLINIC | Age: 62
DRG: 200 | End: 2022-12-25
Attending: FAMILY MEDICINE
Payer: MEDICARE

## 2022-12-25 ENCOUNTER — APPOINTMENT (OUTPATIENT)
Dept: GENERAL RADIOLOGY | Facility: CLINIC | Age: 62
DRG: 200 | End: 2022-12-25
Attending: EMERGENCY MEDICINE
Payer: MEDICARE

## 2022-12-25 ENCOUNTER — APPOINTMENT (OUTPATIENT)
Dept: ULTRASOUND IMAGING | Facility: CLINIC | Age: 62
DRG: 200 | End: 2022-12-25
Attending: EMERGENCY MEDICINE
Payer: MEDICARE

## 2022-12-25 DIAGNOSIS — R50.81 FEVER IN OTHER DISEASES: Primary | ICD-10-CM

## 2022-12-25 DIAGNOSIS — N39.0 URINARY TRACT INFECTION WITH HEMATURIA, SITE UNSPECIFIED: ICD-10-CM

## 2022-12-25 DIAGNOSIS — R21 RASH: ICD-10-CM

## 2022-12-25 DIAGNOSIS — Z72.0 TOBACCO ABUSE: ICD-10-CM

## 2022-12-25 DIAGNOSIS — S22.41XA CLOSED FRACTURE OF MULTIPLE RIBS OF RIGHT SIDE, INITIAL ENCOUNTER: ICD-10-CM
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
## 2022-12-25 DIAGNOSIS — R31.9 URINARY TRACT INFECTION WITH HEMATURIA, SITE UNSPECIFIED: ICD-10-CM

## 2022-12-25 LAB
ALBUMIN SERPL-MCNC: 3.1 G/DL (ref 3.4–5)
ALBUMIN UR-MCNC: NEGATIVE MG/DL
ALP SERPL-CCNC: 53 U/L (ref 40–150)
ALT SERPL W P-5'-P-CCNC: 9 U/L (ref 0–50)
ANION GAP SERPL CALCULATED.3IONS-SCNC: 6 MMOL/L (ref 3–14)
APPEARANCE UR: ABNORMAL
AST SERPL W P-5'-P-CCNC: 12 U/L (ref 0–45)
BASE EXCESS BLDV CALC-SCNC: 3.3 MMOL/L (ref -7.7–1.9)
BASOPHILS # BLD AUTO: 0 10E3/UL (ref 0–0.2)
BASOPHILS NFR BLD AUTO: 0 %
BILIRUB SERPL-MCNC: 0.4 MG/DL (ref 0.2–1.3)
BILIRUB UR QL STRIP: NEGATIVE
BUN SERPL-MCNC: 20 MG/DL (ref 7–30)
CALCIUM SERPL-MCNC: 9.1 MG/DL (ref 8.5–10.1)
CHLORIDE BLD-SCNC: 102 MMOL/L (ref 94–109)
CO2 SERPL-SCNC: 29 MMOL/L (ref 20–32)
COLOR UR AUTO: YELLOW
CREAT SERPL-MCNC: 0.67 MG/DL (ref 0.52–1.04)
EOSINOPHIL # BLD AUTO: 0 10E3/UL (ref 0–0.7)
EOSINOPHIL NFR BLD AUTO: 0 %
ERYTHROCYTE [DISTWIDTH] IN BLOOD BY AUTOMATED COUNT: 13.2 % (ref 10–15)
GFR SERPL CREATININE-BSD FRML MDRD: >90 ML/MIN/1.73M2
GLUCOSE BLD-MCNC: 105 MG/DL (ref 70–99)
GLUCOSE UR STRIP-MCNC: NEGATIVE MG/DL
HCO3 BLDV-SCNC: 31 MMOL/L (ref 21–28)
HCT VFR BLD AUTO: 42.1 % (ref 35–47)
HGB BLD-MCNC: 13.6 G/DL (ref 11.7–15.7)
HGB UR QL STRIP: ABNORMAL
IMM GRANULOCYTES # BLD: 0 10E3/UL
IMM GRANULOCYTES NFR BLD: 1 %
INR PPP: 1.03 (ref 0.85–1.15)
KETONES UR STRIP-MCNC: 15 MG/DL
LACTATE SERPL-SCNC: 1 MMOL/L (ref 0.7–2)
LEUKOCYTE ESTERASE UR QL STRIP: NEGATIVE
LYMPHOCYTES # BLD AUTO: 1.2 10E3/UL (ref 0.8–5.3)
LYMPHOCYTES NFR BLD AUTO: 23 %
MCH RBC QN AUTO: 32.5 PG (ref 26.5–33)
MCHC RBC AUTO-ENTMCNC: 32.3 G/DL (ref 31.5–36.5)
MCV RBC AUTO: 101 FL (ref 78–100)
MONOCYTES # BLD AUTO: 0.5 10E3/UL (ref 0–1.3)
MONOCYTES NFR BLD AUTO: 9 %
MUCOUS THREADS #/AREA URNS LPF: PRESENT /LPF
NEUTROPHILS # BLD AUTO: 3.5 10E3/UL (ref 1.6–8.3)
NEUTROPHILS NFR BLD AUTO: 67 %
NITRATE UR QL: NEGATIVE
NRBC # BLD AUTO: 0 10E3/UL
NRBC BLD AUTO-RTO: 0 /100
O2/TOTAL GAS SETTING VFR VENT: 21 %
PCO2 BLDV: 58 MM HG (ref 40–50)
PH BLDV: 7.33 [PH] (ref 7.32–7.43)
PH UR STRIP: 5.5 [PH] (ref 5–7)
PLATELET # BLD AUTO: 153 10E3/UL (ref 150–450)
PO2 BLDV: 21 MM HG (ref 25–47)
POTASSIUM BLD-SCNC: 4.5 MMOL/L (ref 3.4–5.3)
PROT SERPL-MCNC: 6.3 G/DL (ref 6.8–8.8)
RBC # BLD AUTO: 4.19 10E6/UL (ref 3.8–5.2)
RBC URINE: >182 /HPF
SODIUM SERPL-SCNC: 137 MMOL/L (ref 133–144)
SP GR UR STRIP: 1.02 (ref 1–1.03)
SQUAMOUS EPITHELIAL: <1 /HPF
TROPONIN I SERPL HS-MCNC: 6 NG/L
UROBILINOGEN UR STRIP-MCNC: NORMAL MG/DL
VALPROATE SERPL-MCNC: 136 UG/ML
WBC # BLD AUTO: 5.3 10E3/UL (ref 4–11)
WBC URINE: 1 /HPF

## 2022-12-25 PROCEDURE — G1010 CDSM STANSON: HCPCS

## 2022-12-25 PROCEDURE — 71045 X-RAY EXAM CHEST 1 VIEW: CPT | Mod: 77

## 2022-12-25 PROCEDURE — 76770 US EXAM ABDO BACK WALL COMP: CPT

## 2022-12-25 PROCEDURE — 71045 X-RAY EXAM CHEST 1 VIEW: CPT

## 2022-12-25 PROCEDURE — 250N000011 HC RX IP 250 OP 636: Performed by: FAMILY MEDICINE

## 2022-12-25 PROCEDURE — 36415 COLL VENOUS BLD VENIPUNCTURE: CPT | Performed by: EMERGENCY MEDICINE

## 2022-12-25 PROCEDURE — 93005 ELECTROCARDIOGRAM TRACING: CPT | Performed by: EMERGENCY MEDICINE

## 2022-12-25 PROCEDURE — 85610 PROTHROMBIN TIME: CPT | Performed by: EMERGENCY MEDICINE

## 2022-12-25 PROCEDURE — 84484 ASSAY OF TROPONIN QUANT: CPT | Performed by: EMERGENCY MEDICINE

## 2022-12-25 PROCEDURE — 80164 ASSAY DIPROPYLACETIC ACD TOT: CPT | Performed by: EMERGENCY MEDICINE

## 2022-12-25 PROCEDURE — 99285 EMERGENCY DEPT VISIT HI MDM: CPT | Mod: 25 | Performed by: EMERGENCY MEDICINE

## 2022-12-25 PROCEDURE — 96361 HYDRATE IV INFUSION ADD-ON: CPT | Performed by: EMERGENCY MEDICINE

## 2022-12-25 PROCEDURE — 250N000011 HC RX IP 250 OP 636: Performed by: EMERGENCY MEDICINE

## 2022-12-25 PROCEDURE — 99222 1ST HOSP IP/OBS MODERATE 55: CPT | Performed by: SPECIALIST

## 2022-12-25 PROCEDURE — 250N000009 HC RX 250: Performed by: EMERGENCY MEDICINE

## 2022-12-25 PROCEDURE — 85025 COMPLETE CBC W/AUTO DIFF WBC: CPT | Performed by: EMERGENCY MEDICINE

## 2022-12-25 PROCEDURE — 250N000013 HC RX MED GY IP 250 OP 250 PS 637: Performed by: EMERGENCY MEDICINE

## 2022-12-25 PROCEDURE — 96374 THER/PROPH/DIAG INJ IV PUSH: CPT | Mod: 59 | Performed by: EMERGENCY MEDICINE

## 2022-12-25 PROCEDURE — 96375 TX/PRO/DX INJ NEW DRUG ADDON: CPT | Performed by: EMERGENCY MEDICINE

## 2022-12-25 PROCEDURE — 120N000001 HC R&B MED SURG/OB

## 2022-12-25 PROCEDURE — 80053 COMPREHEN METABOLIC PANEL: CPT | Performed by: EMERGENCY MEDICINE

## 2022-12-25 PROCEDURE — 96376 TX/PRO/DX INJ SAME DRUG ADON: CPT | Performed by: EMERGENCY MEDICINE

## 2022-12-25 PROCEDURE — 258N000003 HC RX IP 258 OP 636: Performed by: EMERGENCY MEDICINE

## 2022-12-25 PROCEDURE — 82803 BLOOD GASES ANY COMBINATION: CPT | Performed by: EMERGENCY MEDICINE

## 2022-12-25 PROCEDURE — 83605 ASSAY OF LACTIC ACID: CPT | Performed by: EMERGENCY MEDICINE

## 2022-12-25 PROCEDURE — 93010 ELECTROCARDIOGRAM REPORT: CPT | Performed by: EMERGENCY MEDICINE

## 2022-12-25 PROCEDURE — 81001 URINALYSIS AUTO W/SCOPE: CPT | Performed by: EMERGENCY MEDICINE

## 2022-12-25 RX ORDER — ONDANSETRON 2 MG/ML
4 INJECTION INTRAMUSCULAR; INTRAVENOUS ONCE
Status: COMPLETED | OUTPATIENT
Start: 2022-12-25 | End: 2022-12-25

## 2022-12-25 RX ORDER — OXYCODONE HYDROCHLORIDE 5 MG/1
5 TABLET ORAL EVERY 6 HOURS PRN
Status: DISCONTINUED | OUTPATIENT
Start: 2022-12-25 | End: 2022-12-28 | Stop reason: HOSPADM

## 2022-12-25 RX ORDER — DOCUSATE SODIUM 100 MG/1
100 CAPSULE, LIQUID FILLED ORAL 2 TIMES DAILY
Status: DISCONTINUED | OUTPATIENT
Start: 2022-12-25 | End: 2022-12-28 | Stop reason: HOSPADM

## 2022-12-25 RX ORDER — ONDANSETRON 2 MG/ML
4 INJECTION INTRAMUSCULAR; INTRAVENOUS EVERY 6 HOURS PRN
Status: DISCONTINUED | OUTPATIENT
Start: 2022-12-25 | End: 2022-12-28 | Stop reason: HOSPADM

## 2022-12-25 RX ORDER — ONDANSETRON 4 MG/1
4 TABLET, ORALLY DISINTEGRATING ORAL EVERY 6 HOURS PRN
Status: DISCONTINUED | OUTPATIENT
Start: 2022-12-25 | End: 2022-12-28 | Stop reason: HOSPADM

## 2022-12-25 RX ORDER — DIVALPROEX SODIUM 500 MG/1
500 TABLET, EXTENDED RELEASE ORAL 2 TIMES DAILY
COMMUNITY
Start: 2022-11-16

## 2022-12-25 RX ORDER — HYDROMORPHONE HYDROCHLORIDE 1 MG/ML
0.5 INJECTION, SOLUTION INTRAMUSCULAR; INTRAVENOUS; SUBCUTANEOUS
Status: DISCONTINUED | OUTPATIENT
Start: 2022-12-25 | End: 2022-12-26

## 2022-12-25 RX ORDER — IOPAMIDOL 755 MG/ML
500 INJECTION, SOLUTION INTRAVASCULAR ONCE
Status: COMPLETED | OUTPATIENT
Start: 2022-12-25 | End: 2022-12-25

## 2022-12-25 RX ORDER — HYDROMORPHONE HYDROCHLORIDE 1 MG/ML
0.3 INJECTION, SOLUTION INTRAMUSCULAR; INTRAVENOUS; SUBCUTANEOUS
Status: COMPLETED | OUTPATIENT
Start: 2022-12-25 | End: 2022-12-25

## 2022-12-25 RX ORDER — HYDROMORPHONE HCL IN WATER/PF 6 MG/30 ML
0.2 PATIENT CONTROLLED ANALGESIA SYRINGE INTRAVENOUS
Status: DISCONTINUED | OUTPATIENT
Start: 2022-12-25 | End: 2022-12-26

## 2022-12-25 RX ORDER — LIDOCAINE 4 G/G
2 PATCH TOPICAL ONCE
Status: COMPLETED | OUTPATIENT
Start: 2022-12-25 | End: 2022-12-26

## 2022-12-25 RX ORDER — SODIUM CHLORIDE 9 MG/ML
INJECTION, SOLUTION INTRAVENOUS CONTINUOUS
Status: DISCONTINUED | OUTPATIENT
Start: 2022-12-25 | End: 2022-12-26

## 2022-12-25 RX ORDER — NICOTINE 21 MG/24HR
1 PATCH, TRANSDERMAL 24 HOURS TRANSDERMAL DAILY
Status: DISCONTINUED | OUTPATIENT
Start: 2022-12-25 | End: 2022-12-28 | Stop reason: HOSPADM

## 2022-12-25 RX ORDER — LIDOCAINE 40 MG/G
CREAM TOPICAL
Status: DISCONTINUED | OUTPATIENT
Start: 2022-12-25 | End: 2022-12-28 | Stop reason: HOSPADM

## 2022-12-25 RX ORDER — ALBUTEROL SULFATE 0.83 MG/ML
2.5 SOLUTION RESPIRATORY (INHALATION) EVERY 4 HOURS PRN
COMMUNITY
Start: 2022-04-22

## 2022-12-25 RX ADMIN — IOPAMIDOL 50 ML: 755 INJECTION, SOLUTION INTRAVENOUS at 15:11

## 2022-12-25 RX ADMIN — ONDANSETRON 4 MG: 2 INJECTION INTRAMUSCULAR; INTRAVENOUS at 22:59

## 2022-12-25 RX ADMIN — HYDROMORPHONE HYDROCHLORIDE 0.3 MG: 1 INJECTION, SOLUTION INTRAMUSCULAR; INTRAVENOUS; SUBCUTANEOUS at 17:57

## 2022-12-25 RX ADMIN — HYDROMORPHONE HYDROCHLORIDE 0.3 MG: 1 INJECTION, SOLUTION INTRAMUSCULAR; INTRAVENOUS; SUBCUTANEOUS at 15:36

## 2022-12-25 RX ADMIN — NICOTINE 1 PATCH: 21 PATCH, EXTENDED RELEASE TRANSDERMAL at 16:44

## 2022-12-25 RX ADMIN — HYDROMORPHONE HYDROCHLORIDE 0.5 MG: 1 INJECTION, SOLUTION INTRAMUSCULAR; INTRAVENOUS; SUBCUTANEOUS at 22:17

## 2022-12-25 RX ADMIN — LIDOCAINE 2 PATCH: 560 PATCH PERCUTANEOUS; TOPICAL; TRANSDERMAL at 16:44

## 2022-12-25 RX ADMIN — HYDROMORPHONE HYDROCHLORIDE 0.3 MG: 1 INJECTION, SOLUTION INTRAMUSCULAR; INTRAVENOUS; SUBCUTANEOUS at 14:45

## 2022-12-25 RX ADMIN — HYDROMORPHONE HYDROCHLORIDE 0.5 MG: 1 INJECTION, SOLUTION INTRAMUSCULAR; INTRAVENOUS; SUBCUTANEOUS at 19:01

## 2022-12-25 RX ADMIN — SODIUM CHLORIDE 60 ML: 9 INJECTION, SOLUTION INTRAVENOUS at 15:11

## 2022-12-25 RX ADMIN — SODIUM CHLORIDE 1000 ML: 9 INJECTION, SOLUTION INTRAVENOUS at 14:41

## 2022-12-25 RX ADMIN — SODIUM CHLORIDE 1000 ML: 9 INJECTION, SOLUTION INTRAVENOUS at 19:01

## 2022-12-25 ASSESSMENT — ACTIVITIES OF DAILY LIVING (ADL)
ADLS_ACUITY_SCORE: 35

## 2022-12-25 NOTE — LETTER
Transition Communication Hand-off for Care Transitions to Next Level of Care Provider    Name: Jemima Yuen  : 1960  MRN #: [id number]  [facility]     Primary Clinic: [address] [address]     Reason for Hospitalization:  Tobacco abuse [Z72.0]  Fall, initial encounter [W19.XXXA]  Closed fracture of multiple ribs of right side, initial encounter [S22.41XA]  Admit Date/Time: 2022  2:13 PM  Discharge Date: 22  Payor Source: Payor: MEDICARE / Plan: MEDICARE / Product Type: Medicare /     Readmission Assessment Measure (MELA) Risk Score/category: high             Reason for Communication Hand-off Referral: Avoidable readmission within 30 days    Discharge Plan:       Concern for non-adherence with plan of care:   Y/N y  Discharge Needs Assessment:  Needs    Flowsheet Row Most Recent Value   Anticipated Changes Related to Illness inability to care for self   Equipment Currently Used at Home walker, rolling   # of Referrals Placed by CTS Post Acute Facilities   PAS Number [id number]          Already enrolled in Tele-monitoring program and name of program:  no  Follow-up specialty is recommended: Yes    Follow-up plan:    Future Appointments   Date Time Provider Department Center   2022 12:00 PM [facility]       Any outstanding tests or procedures:              Key Recommendations:  Patient will discharge to [facility] for rehab following a fall with fx ribs and post knee replacement.     Brook Cavazos RN    AVS/Discharge Summary is the source of truth; this is a helpful guide for improved communication of patient story

## 2022-12-25 NOTE — ED TRIAGE NOTES
Fall on ice/step this morning and c/o R rib pain severe.      Triage Assessment     Row Name 12/25/22 1411       Triage Assessment (Adult)    Airway WDL WDL       Respiratory WDL    Respiratory WDL WDL       Cardiac WDL    Cardiac WDL WDL

## 2022-12-25 NOTE — ED PROVIDER NOTES
History     Chief Complaint   Patient presents with     Fall     HPI  Jemima Yuen is a 62 year old female who presents with moderate to severe right sided mid back pain after she fell just prior to arrival.  Patient states she slipped on the ice and fell into a railing.  Her  however states that she actually just fell over and did not slip on the ice as he was walking behind her.  There was no syncopal episode and no LOC or postictal period after the fall. States that she is extremely weak and this has been ongoing for a long period of time.  She apparently does little activity at home.  He states she just sits on the couch.  She has not been ill recently with fever or chills.  No cough or cold.  No chest pain or shortness of breath.  No vomiting or diarrhea.  She had knee surgery 8 or 9 months ago and never went through rehab or physical therapy per the .  He is concerned that she is just extremely weak.  Patient denies head injury or headache.  No neck pain.  No low back pain.  No radicular symptoms in her arms or legs.    Allergies:  Allergies   Allergen Reactions     Codeine Itching     Percocet [Oxycodone-Acetaminophen] GI Disturbance     Vicodin [Hydrocodone-Acetaminophen] GI Disturbance       Problem List:    Patient Active Problem List    Diagnosis Date Noted     Seizure disorder (H) 07/28/2019     Priority: Medium     Sepsis due to Escherichia coli (H) 07/28/2019     Priority: Medium     Pyelonephritis, acute 07/28/2019     Priority: Medium     Mammogram declined 12/03/2014     Priority: Medium     Pap smear of cervix declined 12/03/2014     Priority: Medium     Tobacco use disorder 12/03/2014     Priority: Medium     Ovarian cyst 03/15/2011     Priority: Medium     Convulsions (H)      Priority: Medium     Epilepsy  Problem list name updated by automated process. Provider to review       CARDIOVASCULAR SCREENING; LDL GOAL LESS THAN 160 10/31/2010     Priority: Medium     Hypertension  2009     Priority: Medium        Past Medical History:    Past Medical History:   Diagnosis Date     Excessive or frequent menstruation      Headache(784.0)      Hypertension 3/17/2009     Other convulsions      Other unspecified back disorder        Past Surgical History:    Past Surgical History:   Procedure Laterality Date     COLONOSCOPY  2011    Procedure:COMBINED COLONOSCOPY, SINGLE BIOPSY/POLYPECTOMY BY BIOPSY; Surgeon:PARRISH PINON; Location:[facility]     HC KNEE SCOPE, DIAGNOSTIC  10/95,     Arthroscopy,  Right Knee x 2     HC REPAIR INTERCARP/CARP-METACARP JT  08    left wrist     ZZC LIGATE FALLOPIAN TUBE,POSTPARTUM  1981    Tubal Ligation       Family History:    Family History   Problem Relation Age of Onset     Neurologic Disorder Mother         epilepsy       Social History:  Marital Status:   [2]  Social History     Tobacco Use     Smoking status: Every Day     Packs/day: 0.25     Types: Cigarettes     Last attempt to quit: 2011     Years since quittin.8     Smokeless tobacco: Never     Tobacco comments:     no smokers in household   Substance Use Topics     Alcohol use: Yes     Comment: dennis x 1/<1x per yr     Drug use: No     Comment: Marijuana in past/nothing since         Medications:    albuterol (PROVENTIL) (2.5 MG/3ML) 0.083% neb solution  alendronate (FOSAMAX) 70 MG tablet  divalproex sodium extended-release (DEPAKOTE ER) 500 MG 24 hr tablet  benzonatate (TESSALON) 200 MG capsule  famotidine (PEPCID) 20 MG tablet  loratadine (CLARITIN) 10 MG tablet  predniSONE (DELTASONE) 20 MG tablet  tacrolimus (PROTOPIC) 0.1 % external ointment  triamcinolone (KENALOG) 0.1 % external ointment          Review of Systems all other systems are reviewed and are negative.    Physical Exam   BP: (!) 151/90  Pulse: 95  Temp: 97.6  F (36.4  C)  Resp: 18  Weight: 43.1 kg (95 lb)  SpO2: 96 %      Physical Exam General alert frail female in moderate distress.  HEENT  is atraumatic.  Neck was supple.  Only spine is nontender.  Lungs reveal poor air movement but no active wheezing.  Tender to palpation in the posterior ribs on the right.  No crepitus.  Anterior chest is nontender abdomen is benign.  Extremities are atraumatic.    ED Course                 Procedures              EKG Interpretation:      Interpreted by Lenny Stinson MD  Time reviewed: 14:40  Symptoms at time of EKG: Posterior rib pain after fall  Rhythm: normal sinus   Rate: Tachycardia  Axis: Normal  Ectopy: none  Conduction: RSR in V1  ST Segments/ T Waves: Non-specific ST-T wave changes  Q Waves: none  Comparison to prior: No old EKG available    Clinical Impression: Sinus tachycardia with nonspecific ST wave changes                Critical Care time:  none               Results for orders placed or performed during the hospital encounter of 12/25/22 (from the past 24 hour(s))   CBC with platelets differential    Narrative    The following orders were created for panel order CBC with platelets differential.  Procedure                               Abnormality         Status                     ---------                               -----------         ------                     CBC with platelets and d...[[id number]]  Abnormal            Final result                 Please view results for these tests on the individual orders.   INR   Result Value Ref Range    INR 1.03 0.85 - 1.15   Comprehensive metabolic panel   Result Value Ref Range    Sodium 137 133 - 144 mmol/L    Potassium 4.5 3.4 - 5.3 mmol/L    Chloride 102 94 - 109 mmol/L    Carbon Dioxide (CO2) 29 20 - 32 mmol/L    Anion Gap 6 3 - 14 mmol/L    Urea Nitrogen 20 7 - 30 mg/dL    Creatinine 0.67 0.52 - 1.04 mg/dL    Calcium 9.1 8.5 - 10.1 mg/dL    Glucose 105 (H) 70 - 99 mg/dL    Alkaline Phosphatase 53 40 - 150 U/L    AST 12 0 - 45 U/L    ALT 9 0 - 50 U/L    Protein Total 6.3 (L) 6.8 - 8.8 g/dL    Albumin 3.1 (L) 3.4 - 5.0 g/dL    Bilirubin Total 0.4  0.2 - 1.3 mg/dL    GFR Estimate >90 >60 mL/min/1.73m2   Lactic acid whole blood   Result Value Ref Range    Lactic Acid 1.0 0.7 - 2.0 mmol/L   Troponin I   Result Value Ref Range    Troponin I High Sensitivity 6 <54 ng/L   Blood gas venous   Result Value Ref Range    pH Venous 7.33 7.32 - 7.43    pCO2 Venous 58 (H) 40 - 50 mm Hg    pO2 Venous 21 (L) 25 - 47 mm Hg    Bicarbonate Venous 31 (H) 21 - 28 mmol/L    Base Excess/Deficit (+/-) 3.3 (H) -7.7 - 1.9 mmol/L    FIO2 21    CBC with platelets and differential   Result Value Ref Range    WBC Count 5.3 4.0 - 11.0 10e3/uL    RBC Count 4.19 3.80 - 5.20 10e6/uL    Hemoglobin 13.6 11.7 - 15.7 g/dL    Hematocrit 42.1 35.0 - 47.0 %     (H) 78 - 100 fL    MCH 32.5 26.5 - 33.0 pg    MCHC 32.3 31.5 - 36.5 g/dL    RDW 13.2 10.0 - 15.0 %    Platelet Count 153 150 - 450 10e3/uL    % Neutrophils 67 %    % Lymphocytes 23 %    % Monocytes 9 %    % Eosinophils 0 %    % Basophils 0 %    % Immature Granulocytes 1 %    NRBCs per 100 WBC 0 <1 /100    Absolute Neutrophils 3.5 1.6 - 8.3 10e3/uL    Absolute Lymphocytes 1.2 0.8 - 5.3 10e3/uL    Absolute Monocytes 0.5 0.0 - 1.3 10e3/uL    Absolute Eosinophils 0.0 0.0 - 0.7 10e3/uL    Absolute Basophils 0.0 0.0 - 0.2 10e3/uL    Absolute Immature Granulocytes 0.0 <=0.4 10e3/uL    Absolute NRBCs 0.0 10e3/uL   CT CHEST W CONTRAST    Narrative    EXAM: CT CHEST W CONTRAST  LOCATION: [facility]  DATE/TIME: 12/25/2022 3:38 PM    INDICATION: Fall with right posterior rib pain.  COMPARISON: Chest x-ray on 04/09/2022.  TECHNIQUE: CT chest with IV contrast. Multiplanar reformats were obtained. Dose reduction techniques were used.    CONTRAST: 50mL, Isovue 370    FINDINGS:   LUNGS AND PLEURA: Small right pneumothorax, small layering hemothorax in the right lung base and mild right basilar atelectasis. Mild emphysema. No left pneumothorax or pleural effusion. A 2 mm right upper lobe nodule  (4/70).    MEDIASTINUM/AXILLAE: No lymphadenopathy. No pulmonary emboli. No thoracic aortic aneurysm or thoracic aortic injury. No pericardial effusion.    CORONARY ARTERY CALCIFICATION: Mild.    UPPER ABDOMEN: Few small hepatic cysts. Cholecystectomy. Renal cysts bilaterally.    MUSCULOSKELETAL: Acute displaced and mildly comminuted fractures of the posterior right 9th and 10th ribs. No other displaced fractures.      Impression    IMPRESSION:   1.  Acute displaced and mildly comminuted fractures of the right posterior 9th and 10th ribs. Associated small right-sided pneumothorax and hemothorax.  2.  Mild emphysema.  3.  Right upper lobe 2 mm nodule. See follow-up guidelines.    REFERENCE:  Guidelines for Management of Incidental Pulmonary Nodules Detected on CT Images: From the Fleischner Society 2017.   Guidelines apply to incidental nodules in patients who are 35 years or older.  Guidelines do not apply to lung cancer screening, patients with immunosuppression, or patients with known primary cancer.    SINGLE NODULE  Nodule size <6 mm  Low-risk patients: No follow-up needed.  High-risk patients: Optional follow-up at 12 months.     UA with Microscopic reflex to Culture    Specimen: Urine, Clean Catch   Result Value Ref Range    Color Urine Yellow Colorless, Straw, Light Yellow, Yellow    Appearance Urine Slightly Cloudy (A) Clear    Glucose Urine Negative Negative mg/dL    Bilirubin Urine Negative Negative    Ketones Urine 15 (A) Negative mg/dL    Specific Gravity Urine 1.025 1.003 - 1.035    Blood Urine Large (A) Negative    pH Urine 5.5 5.0 - 7.0    Protein Albumin Urine Negative Negative mg/dL    Urobilinogen Urine Normal Normal, 2.0 mg/dL    Nitrite Urine Negative Negative    Leukocyte Esterase Urine Negative Negative    Mucus Urine Present (A) None Seen /LPF    RBC Urine >182 (H) <=2 /HPF    WBC Urine 1 <=5 /HPF    Squamous Epithelials Urine <1 <=1 /HPF    Narrative    Urine Culture not indicated   XR Chest  Port 1 View    Narrative    EXAM: XR CHEST PORT 1 VIEW  LOCATION: [facility]  DATE/TIME: 12/25/2022 4:40 PM    INDICATION: Rib fracture with pneumothorax  COMPARISON: CT performed the same day      Impression    IMPRESSION: Heart normal in size. Right apical pneumothorax better visualized on CT performed the same day. Fractures of the right ninth and 10th ribs are seen on CT. Lungs are clear.       Medications   0.9% sodium chloride BOLUS (0 mLs Intravenous Stopped 12/25/22 1541)     Followed by   sodium chloride 0.9% infusion (has no administration in time range)   HYDROmorphone (PF) (DILAUDID) injection 0.3 mg (0.3 mg Intravenous Given 12/25/22 1536)   nicotine Patch in Place (has no administration in time range)   nicotine (NICODERM CQ) 21 MG/24HR 24 hr patch 1 patch (1 patch Transdermal Patch/Med Applied 12/25/22 1644)   Lidocaine (LIDOCARE) 4 % Patch 2 patch (2 patches Transdermal Patch/Med Applied 12/25/22 1644)   Saline 100mL Bag (60 mLs Intravenous Given 12/25/22 1511)   iopamidol (ISOVUE-370) solution 500 mL (50 mLs Intravenous Given 12/25/22 1511)       Assessments & Plan (with Medical Decision Making)   Jemima Yuen is a 62 year old female who presents with moderate to severe right sided mid back pain after she fell just prior to arrival.  Patient states she slipped on the ice and fell into a railing.  Her  however states that she actually just fell over and did not slip on the ice as he was walking behind her.  There was no syncopal episode and no LOC or postictal period after the fall. States that she is extremely weak and this has been ongoing for a long period of time.  She apparently does little activity at home.  He states she just sits on the couch.  She has not been ill recently with fever or chills.  No cough or cold.  No chest pain or shortness of breath.  No vomiting or diarrhea.  She had knee surgery 8 or 9 months ago and never went through rehab or  physical therapy per the .  He is concerned that she is just extremely weak.  Patient denies head injury or headache.  No neck pain.  No low back pain.  No radicular symptoms in her arms or legs.  On presentation patient was afebrile and vitally stable.  On exam other than posterior right rib pain and poor air movement on auscultation no acute findings were noted.  EKG showed a sinus tachycardia.  She was borderline at 101.  No acute ischemic changes no old EKG for comparison.  Blood testing showed.  Chest CT with contrast showed displaced comminuted rib fractures at 9 and 10 posteriorly.  Also a small pneumo possible hemothorax.  This was reviewed by  from surgery.  He recommended a plain x-ray and then repeat in 6 hours to see if the pneumohemothorax has expanded requiring a chest tube.  If not she will be admitted for pain control.  She is given a nicotine patch as she is a daily smoker.  Also Lidoderm patch over the back.  Patient does have grade 182 red cells in her urine but no evidence of infection.  A renal ultrasound was ordered to further assess for renal injury.  Dr. Cerrato to assume care at shift change.  I have reviewed the nursing notes.    I have reviewed the findings, diagnosis, plan and need for follow up with the patient.       New Prescriptions    No medications on file       Final diagnoses:   Fall, initial encounter   Closed fracture of multiple ribs of right side, initial encounter   Tobacco abuse       12/25/2022   [facility] EMERGENCY DEPT     Lenny Stinson MD  12/25/22 5814       Lenny Stinson MD  12/25/22 4904       Lenny Stinson MD  12/25/22 5471

## 2022-12-26 ENCOUNTER — APPOINTMENT (OUTPATIENT)
Dept: GENERAL RADIOLOGY | Facility: CLINIC | Age: 62
DRG: 200 | End: 2022-12-26
Attending: INTERNAL MEDICINE
Payer: MEDICARE

## 2022-12-26 ENCOUNTER — APPOINTMENT (OUTPATIENT)
Dept: GENERAL RADIOLOGY | Facility: CLINIC | Age: 62
DRG: 200 | End: 2022-12-26
Attending: SPECIALIST
Payer: MEDICARE

## 2022-12-26 ENCOUNTER — APPOINTMENT (OUTPATIENT)
Dept: PHYSICAL THERAPY | Facility: CLINIC | Age: 62
DRG: 200 | End: 2022-12-26
Attending: SPECIALIST
Payer: MEDICARE

## 2022-12-26 LAB
ALBUMIN SERPL-MCNC: 2.5 G/DL (ref 3.4–5)
ALBUMIN UR-MCNC: 30 MG/DL
ALP SERPL-CCNC: 42 U/L (ref 40–150)
ALT SERPL W P-5'-P-CCNC: 10 U/L (ref 0–50)
ANION GAP SERPL CALCULATED.3IONS-SCNC: 6 MMOL/L (ref 3–14)
APPEARANCE UR: ABNORMAL
AST SERPL W P-5'-P-CCNC: 17 U/L (ref 0–45)
BACTERIA #/AREA URNS HPF: ABNORMAL /HPF
BILIRUB SERPL-MCNC: 0.5 MG/DL (ref 0.2–1.3)
BILIRUB UR QL STRIP: ABNORMAL
BUN SERPL-MCNC: 12 MG/DL (ref 7–30)
CALCIUM SERPL-MCNC: 8.4 MG/DL (ref 8.5–10.1)
CHLORIDE BLD-SCNC: 104 MMOL/L (ref 94–109)
CO2 SERPL-SCNC: 26 MMOL/L (ref 20–32)
COLOR UR AUTO: ABNORMAL
CREAT SERPL-MCNC: 0.42 MG/DL (ref 0.52–1.04)
ERYTHROCYTE [DISTWIDTH] IN BLOOD BY AUTOMATED COUNT: 13.4 % (ref 10–15)
ERYTHROCYTE [DISTWIDTH] IN BLOOD BY AUTOMATED COUNT: 13.6 % (ref 10–15)
GFR SERPL CREATININE-BSD FRML MDRD: >90 ML/MIN/1.73M2
GLUCOSE BLD-MCNC: 69 MG/DL (ref 70–99)
GLUCOSE BLDC GLUCOMTR-MCNC: 103 MG/DL (ref 70–99)
GLUCOSE BLDC GLUCOMTR-MCNC: 84 MG/DL (ref 70–99)
GLUCOSE UR STRIP-MCNC: NEGATIVE MG/DL
HCT VFR BLD AUTO: 35.7 % (ref 35–47)
HCT VFR BLD AUTO: 37.1 % (ref 35–47)
HGB BLD-MCNC: 11.5 G/DL (ref 11.7–15.7)
HGB BLD-MCNC: 11.8 G/DL (ref 11.7–15.7)
HGB UR QL STRIP: ABNORMAL
KETONES UR STRIP-MCNC: NEGATIVE MG/DL
LACTATE SERPL-SCNC: 0.6 MMOL/L (ref 0.7–2)
LEUKOCYTE ESTERASE UR QL STRIP: NEGATIVE
MCH RBC QN AUTO: 32.7 PG (ref 26.5–33)
MCH RBC QN AUTO: 33 PG (ref 26.5–33)
MCHC RBC AUTO-ENTMCNC: 31.8 G/DL (ref 31.5–36.5)
MCHC RBC AUTO-ENTMCNC: 32.2 G/DL (ref 31.5–36.5)
MCV RBC AUTO: 103 FL (ref 78–100)
MCV RBC AUTO: 103 FL (ref 78–100)
MUCOUS THREADS #/AREA URNS LPF: PRESENT /LPF
NITRATE UR QL: NEGATIVE
PH UR STRIP: 5.5 [PH] (ref 5–7)
PLATELET # BLD AUTO: 113 10E3/UL (ref 150–450)
PLATELET # BLD AUTO: 119 10E3/UL (ref 150–450)
POTASSIUM BLD-SCNC: 4.4 MMOL/L (ref 3.4–5.3)
PROT SERPL-MCNC: 5.4 G/DL (ref 6.8–8.8)
RBC # BLD AUTO: 3.48 10E6/UL (ref 3.8–5.2)
RBC # BLD AUTO: 3.61 10E6/UL (ref 3.8–5.2)
RBC URINE: >182 /HPF
SODIUM SERPL-SCNC: 136 MMOL/L (ref 133–144)
SP GR UR STRIP: >=1.03 (ref 1–1.03)
SQUAMOUS EPITHELIAL: <1 /HPF
UROBILINOGEN UR STRIP-MCNC: NORMAL MG/DL
WBC # BLD AUTO: 5.5 10E3/UL (ref 4–11)
WBC # BLD AUTO: 5.9 10E3/UL (ref 4–11)
WBC URINE: 22 /HPF

## 2022-12-26 PROCEDURE — 36415 COLL VENOUS BLD VENIPUNCTURE: CPT | Performed by: INTERNAL MEDICINE

## 2022-12-26 PROCEDURE — 99232 SBSQ HOSP IP/OBS MODERATE 35: CPT | Performed by: SPECIALIST

## 2022-12-26 PROCEDURE — 250N000011 HC RX IP 250 OP 636: Performed by: INTERNAL MEDICINE

## 2022-12-26 PROCEDURE — 36415 COLL VENOUS BLD VENIPUNCTURE: CPT | Performed by: SPECIALIST

## 2022-12-26 PROCEDURE — 80053 COMPREHEN METABOLIC PANEL: CPT | Performed by: SPECIALIST

## 2022-12-26 PROCEDURE — 250N000013 HC RX MED GY IP 250 OP 250 PS 637: Performed by: INTERNAL MEDICINE

## 2022-12-26 PROCEDURE — 97161 PT EVAL LOW COMPLEX 20 MIN: CPT | Mod: GP | Performed by: PHYSICAL THERAPIST

## 2022-12-26 PROCEDURE — 81001 URINALYSIS AUTO W/SCOPE: CPT | Performed by: INTERNAL MEDICINE

## 2022-12-26 PROCEDURE — 87040 BLOOD CULTURE FOR BACTERIA: CPT | Performed by: INTERNAL MEDICINE

## 2022-12-26 PROCEDURE — 250N000013 HC RX MED GY IP 250 OP 250 PS 637: Performed by: PEDIATRICS

## 2022-12-26 PROCEDURE — 83605 ASSAY OF LACTIC ACID: CPT | Performed by: INTERNAL MEDICINE

## 2022-12-26 PROCEDURE — 71045 X-RAY EXAM CHEST 1 VIEW: CPT | Mod: 77

## 2022-12-26 PROCEDURE — 258N000003 HC RX IP 258 OP 636: Performed by: SPECIALIST

## 2022-12-26 PROCEDURE — 250N000011 HC RX IP 250 OP 636: Performed by: SPECIALIST

## 2022-12-26 PROCEDURE — 71045 X-RAY EXAM CHEST 1 VIEW: CPT

## 2022-12-26 PROCEDURE — 85027 COMPLETE CBC AUTOMATED: CPT | Performed by: INTERNAL MEDICINE

## 2022-12-26 PROCEDURE — 85027 COMPLETE CBC AUTOMATED: CPT | Performed by: SPECIALIST

## 2022-12-26 PROCEDURE — 97530 THERAPEUTIC ACTIVITIES: CPT | Mod: GP | Performed by: PHYSICAL THERAPIST

## 2022-12-26 PROCEDURE — 250N000013 HC RX MED GY IP 250 OP 250 PS 637: Performed by: SPECIALIST

## 2022-12-26 PROCEDURE — 250N000013 HC RX MED GY IP 250 OP 250 PS 637: Performed by: EMERGENCY MEDICINE

## 2022-12-26 PROCEDURE — 120N000001 HC R&B MED SURG/OB

## 2022-12-26 RX ORDER — ALENDRONATE SODIUM 70 MG/1
70 TABLET ORAL WEEKLY
Status: DISCONTINUED | OUTPATIENT
Start: 2022-12-26 | End: 2022-12-26 | Stop reason: CLARIF

## 2022-12-26 RX ORDER — NALOXONE HYDROCHLORIDE 0.4 MG/ML
0.2 INJECTION, SOLUTION INTRAMUSCULAR; INTRAVENOUS; SUBCUTANEOUS
Status: DISCONTINUED | OUTPATIENT
Start: 2022-12-26 | End: 2022-12-28 | Stop reason: HOSPADM

## 2022-12-26 RX ORDER — FAMOTIDINE 20 MG/1
20 TABLET, FILM COATED ORAL 2 TIMES DAILY
Status: DISCONTINUED | OUTPATIENT
Start: 2022-12-26 | End: 2022-12-26

## 2022-12-26 RX ORDER — ACETAMINOPHEN 325 MG/1
650 TABLET ORAL EVERY 4 HOURS PRN
Status: DISCONTINUED | OUTPATIENT
Start: 2022-12-26 | End: 2022-12-28 | Stop reason: HOSPADM

## 2022-12-26 RX ORDER — CYCLOBENZAPRINE HCL 10 MG
10 TABLET ORAL 3 TIMES DAILY PRN
Status: DISCONTINUED | OUTPATIENT
Start: 2022-12-26 | End: 2022-12-28 | Stop reason: HOSPADM

## 2022-12-26 RX ORDER — BENZONATATE 100 MG/1
200 CAPSULE ORAL 3 TIMES DAILY PRN
Status: DISCONTINUED | OUTPATIENT
Start: 2022-12-26 | End: 2022-12-26

## 2022-12-26 RX ORDER — NALOXONE HYDROCHLORIDE 0.4 MG/ML
0.4 INJECTION, SOLUTION INTRAMUSCULAR; INTRAVENOUS; SUBCUTANEOUS
Status: DISCONTINUED | OUTPATIENT
Start: 2022-12-26 | End: 2022-12-28 | Stop reason: HOSPADM

## 2022-12-26 RX ORDER — NICOTINE POLACRILEX 4 MG
15-30 LOZENGE BUCCAL
Status: DISCONTINUED | OUTPATIENT
Start: 2022-12-26 | End: 2022-12-28 | Stop reason: HOSPADM

## 2022-12-26 RX ORDER — DIVALPROEX SODIUM 250 MG/1
500 TABLET, EXTENDED RELEASE ORAL 2 TIMES DAILY
Status: DISCONTINUED | OUTPATIENT
Start: 2022-12-26 | End: 2022-12-28 | Stop reason: HOSPADM

## 2022-12-26 RX ORDER — HYDROMORPHONE HYDROCHLORIDE 1 MG/ML
0.3 INJECTION, SOLUTION INTRAMUSCULAR; INTRAVENOUS; SUBCUTANEOUS
Status: DISCONTINUED | OUTPATIENT
Start: 2022-12-26 | End: 2022-12-27

## 2022-12-26 RX ADMIN — HYDROMORPHONE HYDROCHLORIDE 0.2 MG: 0.2 INJECTION, SOLUTION INTRAMUSCULAR; INTRAVENOUS; SUBCUTANEOUS at 04:20

## 2022-12-26 RX ADMIN — FAMOTIDINE 20 MG: 10 INJECTION INTRAVENOUS at 08:10

## 2022-12-26 RX ADMIN — DIVALPROEX SODIUM 500 MG: 250 TABLET, EXTENDED RELEASE ORAL at 08:10

## 2022-12-26 RX ADMIN — CYCLOBENZAPRINE 10 MG: 10 TABLET, FILM COATED ORAL at 06:20

## 2022-12-26 RX ADMIN — ACETAMINOPHEN 650 MG: 325 TABLET, FILM COATED ORAL at 21:30

## 2022-12-26 RX ADMIN — DEXTROSE 15 G: 15 GEL ORAL at 08:24

## 2022-12-26 RX ADMIN — DOCUSATE SODIUM 100 MG: 100 CAPSULE, LIQUID FILLED ORAL at 20:43

## 2022-12-26 RX ADMIN — DEXTROSE AND SODIUM CHLORIDE: 5; 900 INJECTION, SOLUTION INTRAVENOUS at 20:50

## 2022-12-26 RX ADMIN — HYDROMORPHONE HYDROCHLORIDE 0.3 MG: 1 INJECTION, SOLUTION INTRAMUSCULAR; INTRAVENOUS; SUBCUTANEOUS at 11:13

## 2022-12-26 RX ADMIN — OXYCODONE HYDROCHLORIDE 5 MG: 5 TABLET ORAL at 18:11

## 2022-12-26 RX ADMIN — HYDROMORPHONE HYDROCHLORIDE 0.3 MG: 1 INJECTION, SOLUTION INTRAMUSCULAR; INTRAVENOUS; SUBCUTANEOUS at 22:34

## 2022-12-26 RX ADMIN — FAMOTIDINE 20 MG: 10 INJECTION INTRAVENOUS at 01:26

## 2022-12-26 RX ADMIN — DIVALPROEX SODIUM 500 MG: 250 TABLET, EXTENDED RELEASE ORAL at 20:43

## 2022-12-26 RX ADMIN — FAMOTIDINE 20 MG: 10 INJECTION INTRAVENOUS at 20:43

## 2022-12-26 RX ADMIN — NICOTINE 1 PATCH: 21 PATCH, EXTENDED RELEASE TRANSDERMAL at 08:14

## 2022-12-26 RX ADMIN — DEXTROSE AND SODIUM CHLORIDE: 5; 900 INJECTION, SOLUTION INTRAVENOUS at 01:14

## 2022-12-26 ASSESSMENT — ACTIVITIES OF DAILY LIVING (ADL)
ADLS_ACUITY_SCORE: 30
ADLS_ACUITY_SCORE: 31
ADLS_ACUITY_SCORE: 29
ADLS_ACUITY_SCORE: 29
ADLS_ACUITY_SCORE: 31
ADLS_ACUITY_SCORE: 30
ADLS_ACUITY_SCORE: 28
ADLS_ACUITY_SCORE: 30
ADLS_ACUITY_SCORE: 39
DEPENDENT_IADLS:: CLEANING;COOKING;LAUNDRY;SHOPPING;MEAL PREPARATION
ADLS_ACUITY_SCORE: 31
ADLS_ACUITY_SCORE: 29
ADLS_ACUITY_SCORE: 30

## 2022-12-26 NOTE — H&P
[facility] History and Physical    Jemima Yuen MRN# [id number]   Age: 62 year old YOB: 1960     Date of Admission:  12/25/2022    Home clinic: [facility]  Primary care provider: Clinic, [facility]          Impression and Plan:   Impression:   Patient status post fall with right-sided rib fractures and small pneumo hemothorax.  Stable on 6-hour film.  Also generalized weakness, may not be safe for home.      Plan:   1.  Admit  2.  A.m. chest x-ray  3.  Diet as tolerated  4.  Incentive spirometry  5.  Pain control  6.  PT evaluation  7.  Social work evaluation for placement           Chief Complaint:   Status post fall    History is obtained from the patient          History of Present Illness:   This 62 year old female who was going down the back steps of her house when she slipped on ice and fell onto a metal railing on her right side.  She was on her way to visit her brother in the nursing home.  She visited her brother in the nursing home and then began complaining of right-sided chest pain and some shortness of breath.  She does smoke about a pack a day and has some COPD.  She came to the ER and subsequent set chest CT and there revealed a small pneumothorax as well as a small hemothorax.  It was not visualized on plain chest x-ray.  A 6-hour film was stable with no change.  I am now asked to see her for admission for pain control.  Her  also feels is not safe for her to go home due to her generalized weakness.  She does walk with a walker due to some knee replacements done 2 years ago.  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           Past Medical History:     Past Medical History:   Diagnosis Date     Excessive or frequent menstruation 2/01     Headache(784.0)      Hypertension 3/17/2009     Other convulsions     Epilepsy     Other unspecified back disorder     ?sacroiliitis, ?sciatica, chronic intermittent symptoms            Past Surgical  History:     Past Surgical History:   Procedure Laterality Date     COLONOSCOPY  2011    Procedure:COMBINED COLONOSCOPY, SINGLE BIOPSY/POLYPECTOMY BY BIOPSY; Surgeon:PARRISH PINON; Location:[facility]     HC KNEE SCOPE, DIAGNOSTIC  10/95,     Arthroscopy,  Right Knee x 2     HC REPAIR INTERCARP/CARP-METACARP JT  08    left wrist     ZZC LIGATE FALLOPIAN TUBE,POSTPARTUM  1981    Tubal Ligation            Social History:     Social History     Tobacco Use     Smoking status: Every Day     Packs/day: 0.25     Types: Cigarettes     Last attempt to quit: 2011     Years since quittin.8     Smokeless tobacco: Never     Tobacco comments:     no smokers in household   Substance Use Topics     Alcohol use: Yes     Comment: dennis x 1/<1x per yr            Family History:     Family History   Problem Relation Age of Onset     Neurologic Disorder Mother         epilepsy            Immunizations:     VACCINE/DOSE   Diptheria   DPT   DTAP   HBIG   Hepatitis A   Hepatitis B   HIB   Influenza   Measles   Meningococcal   MMR   Mumps   Pneumococcal   Polio   Rubella   Small Pox   TDAP   Varicella   Zoster            Allergies:     Allergies   Allergen Reactions     Codeine Itching     Percocet [Oxycodone-Acetaminophen] GI Disturbance     Vicodin [Hydrocodone-Acetaminophen] GI Disturbance            Medications:     Current Facility-Administered Medications   Medication     dextrose 5% and 0.9% NaCl infusion     docusate sodium (COLACE) capsule 100 mg     famotidine (PEPCID) injection 20 mg     HYDROmorphone (DILAUDID) injection 0.2 mg     HYDROmorphone (PF) (DILAUDID) injection 0.5 mg     Lidocaine (LIDOCARE) 4 % Patch 2 patch     lidocaine (LMX4) kit     lidocaine 1 % 0.1-1 mL     melatonin tablet 1 mg     nicotine (NICODERM CQ) 21 MG/24HR 24 hr patch 1 patch     [START ON 2022] nicotine (NICODERM CQ) 7 MG/24HR 24 hr patch 1 patch     nicotine Patch in Place     nicotine Patch in Place      ondansetron (ZOFRAN ODT) ODT tab 4 mg    Or     ondansetron (ZOFRAN) injection 4 mg     oxyCODONE (ROXICODONE) tablet 5 mg     oxyCODONE IR (ROXICODONE) half-tab 2.5 mg     sodium chloride (PF) 0.9% PF flush 3 mL     sodium chloride (PF) 0.9% PF flush 3 mL     sodium chloride 0.9% infusion     Current Outpatient Medications   Medication Sig     albuterol (PROVENTIL) (2.5 MG/3ML) 0.083% neb solution Inhale 2.5 mg into the lungs every 4 hours as needed for cough, shortness of breath or wheezing     alendronate (FOSAMAX) 70 MG tablet Take 70 mg by mouth once a week Mondays     divalproex sodium extended-release (DEPAKOTE ER) 500 MG 24 hr tablet Take 500 mg by mouth 2 times daily     benzonatate (TESSALON) 200 MG capsule Take 1 capsule (200 mg) by mouth 3 times daily as needed for cough     famotidine (PEPCID) 20 MG tablet Take 1 tablet (20 mg) by mouth 2 times daily     loratadine (CLARITIN) 10 MG tablet Take 1 tablet (10 mg) by mouth daily as needed (itch/allergic reaction)     predniSONE (DELTASONE) 20 MG tablet Take two tablets (= 40mg) each day for 5 (five) days     tacrolimus (PROTOPIC) 0.1 % external ointment APPLY OINTMENT EXTERNALLY TO AFFECTED AREA TWICE DAILY TO THE FACE.     triamcinolone (KENALOG) 0.1 % external ointment APPLY OINTMENT TOPICALLY TO AFFECTED AREA TWICE DAILY. NOT FOR USE ON FACE, UNDERARMS, OR GROIN.             Review of Systems:   The review of systems was positive for the following findings.  None.  The remainder of the review of systems was unremarkable.          Physical Exam:   PE:  Vitals:    12/25/22 2233 12/25/22 2244 12/25/22 2259 12/25/22 2314   BP: (!) 136/115  (!) 153/74    Pulse: 105  113    Resp:       Temp:       TempSrc:       SpO2: 93% 96% 91% 92%   Weight:           General: well developed, poorly nourished frail female who appears much older than her stated age  HEENT: NC/AT, EOMI, (-)icterus, (-)injection  Neck: Supple, No JVD  Chest: Scattered rhonchi bilaterally, right  posterior chest tenderness  Heart: S1, S2, (-)m/r/g  Abd: Soft, non tender, non distended, non tender, no masses  Ext; Warm, no edema  Psych: AAOx3  Neuro: No focal deficits            Data:   All laboratory data reviewed  Results for orders placed or performed during the hospital encounter of 12/25/22   CT CHEST W CONTRAST     Status: None    Narrative    EXAM: CT CHEST W CONTRAST  LOCATION: [facility]  DATE/TIME: 12/25/2022 3:38 PM    INDICATION: Fall with right posterior rib pain.  COMPARISON: Chest x-ray on 04/09/2022.  TECHNIQUE: CT chest with IV contrast. Multiplanar reformats were obtained. Dose reduction techniques were used.    CONTRAST: 50mL, Isovue 370    FINDINGS:   LUNGS AND PLEURA: Small right pneumothorax, small layering hemothorax in the right lung base and mild right basilar atelectasis. Mild emphysema. No left pneumothorax or pleural effusion. A 2 mm right upper lobe nodule (4/70).    MEDIASTINUM/AXILLAE: No lymphadenopathy. No pulmonary emboli. No thoracic aortic aneurysm or thoracic aortic injury. No pericardial effusion.    CORONARY ARTERY CALCIFICATION: Mild.    UPPER ABDOMEN: Few small hepatic cysts. Cholecystectomy. Renal cysts bilaterally.    MUSCULOSKELETAL: Acute displaced and mildly comminuted fractures of the posterior right 9th and 10th ribs. No other displaced fractures.      Impression    IMPRESSION:   1.  Acute displaced and mildly comminuted fractures of the right posterior 9th and 10th ribs. Associated small right-sided pneumothorax and hemothorax.  2.  Mild emphysema.  3.  Right upper lobe 2 mm nodule. See follow-up guidelines.    REFERENCE:  Guidelines for Management of Incidental Pulmonary Nodules Detected on CT Images: From the Fleischner Society 2017.   Guidelines apply to incidental nodules in patients who are 35 years or older.  Guidelines do not apply to lung cancer screening, patients with immunosuppression, or patients with known primary  cancer.    SINGLE NODULE  Nodule size <6 mm  Low-risk patients: No follow-up needed.  High-risk patients: Optional follow-up at 12 months.     XR Chest Port 1 View     Status: None    Narrative    EXAM: XR CHEST PORT 1 VIEW  LOCATION: [facility]  DATE/TIME: 12/25/2022 4:40 PM    INDICATION: Rib fracture with pneumothorax  COMPARISON: CT performed the same day      Impression    IMPRESSION: Heart normal in size. Right apical pneumothorax better visualized on CT performed the same day. Fractures of the right ninth and 10th ribs are seen on CT. Lungs are clear.   US Renal Complete Non-Vascular     Status: None    Narrative    EXAM: US RENAL COMPLETE NON-VASCULAR  LOCATION: [facility]  DATE/TIME: 12/25/2022 6:16 PM    INDICATION: Right posterior rib fractures with hematuria  COMPARISON: Ultrasound 7/29/2019, CT 8/9/2021  TECHNIQUE: Routine Bilateral Renal and Bladder Ultrasound.    FINDINGS:    RIGHT KIDNEY: 8.1 x 4.3 x 3.9 cm. Diffusely echogenic renal parenchyma consistent with medical renal disease. No hydronephrosis. No renal injury or perinephric fluid collection evident.     LEFT KIDNEY: 8.5 x 3.7 x 3.5 cm. No hydronephrosis. Diffusely echogenic renal parenchyma. 2.1 cm simple lower pole cyst    BLADDER: Normal.      Impression    IMPRESSION:  1.  Mildly small kidneys with prominent increased echogenicity of parenchyma most suggestive of medical renal disease. No hydronephrosis. No acute abnormality evident.   XR Chest Port 1 View     Status: None    Narrative    EXAM: XR CHEST PORT 1 VIEW  LOCATION: [facility]  DATE/TIME: 12/25/2022 10:16 PM    INDICATION: f u apical pneumothorax  COMPARISON: Earlier today at 1621 hours.      Impression    IMPRESSION: No visible pneumothorax. Increased atelectasis at right lung base. Lungs otherwise clear. Heart size normal.   INR     Status: Normal   Result Value Ref Range    INR 1.03  0.85 - 1.15   Comprehensive metabolic panel     Status: Abnormal   Result Value Ref Range    Sodium 137 133 - 144 mmol/L    Potassium 4.5 3.4 - 5.3 mmol/L    Chloride 102 94 - 109 mmol/L    Carbon Dioxide (CO2) 29 20 - 32 mmol/L    Anion Gap 6 3 - 14 mmol/L    Urea Nitrogen 20 7 - 30 mg/dL    Creatinine 0.67 0.52 - 1.04 mg/dL    Calcium 9.1 8.5 - 10.1 mg/dL    Glucose 105 (H) 70 - 99 mg/dL    Alkaline Phosphatase 53 40 - 150 U/L    AST 12 0 - 45 U/L    ALT 9 0 - 50 U/L    Protein Total 6.3 (L) 6.8 - 8.8 g/dL    Albumin 3.1 (L) 3.4 - 5.0 g/dL    Bilirubin Total 0.4 0.2 - 1.3 mg/dL    GFR Estimate >90 >60 mL/min/1.73m2   Lactic acid whole blood     Status: Normal   Result Value Ref Range    Lactic Acid 1.0 0.7 - 2.0 mmol/L   Troponin I     Status: Normal   Result Value Ref Range    Troponin I High Sensitivity 6 <54 ng/L   UA with Microscopic reflex to Culture     Status: Abnormal    Specimen: Urine, Clean Catch   Result Value Ref Range    Color Urine Yellow Colorless, Straw, Light Yellow, Yellow    Appearance Urine Slightly Cloudy (A) Clear    Glucose Urine Negative Negative mg/dL    Bilirubin Urine Negative Negative    Ketones Urine 15 (A) Negative mg/dL    Specific Gravity Urine 1.025 1.003 - 1.035    Blood Urine Large (A) Negative    pH Urine 5.5 5.0 - 7.0    Protein Albumin Urine Negative Negative mg/dL    Urobilinogen Urine Normal Normal, 2.0 mg/dL    Nitrite Urine Negative Negative    Leukocyte Esterase Urine Negative Negative    Mucus Urine Present (A) None Seen /LPF    RBC Urine >182 (H) <=2 /HPF    WBC Urine 1 <=5 /HPF    Squamous Epithelials Urine <1 <=1 /HPF    Narrative    Urine Culture not indicated   Blood gas venous     Status: Abnormal   Result Value Ref Range    pH Venous 7.33 7.32 - 7.43    pCO2 Venous 58 (H) 40 - 50 mm Hg    pO2 Venous 21 (L) 25 - 47 mm Hg    Bicarbonate Venous 31 (H) 21 - 28 mmol/L    Base Excess/Deficit (+/-) 3.3 (H) -7.7 - 1.9 mmol/L    FIO2 21    CBC with platelets and  differential     Status: Abnormal   Result Value Ref Range    WBC Count 5.3 4.0 - 11.0 10e3/uL    RBC Count 4.19 3.80 - 5.20 10e6/uL    Hemoglobin 13.6 11.7 - 15.7 g/dL    Hematocrit 42.1 35.0 - 47.0 %     (H) 78 - 100 fL    MCH 32.5 26.5 - 33.0 pg    MCHC 32.3 31.5 - 36.5 g/dL    RDW 13.2 10.0 - 15.0 %    Platelet Count 153 150 - 450 10e3/uL    % Neutrophils 67 %    % Lymphocytes 23 %    % Monocytes 9 %    % Eosinophils 0 %    % Basophils 0 %    % Immature Granulocytes 1 %    NRBCs per 100 WBC 0 <1 /100    Absolute Neutrophils 3.5 1.6 - 8.3 10e3/uL    Absolute Lymphocytes 1.2 0.8 - 5.3 10e3/uL    Absolute Monocytes 0.5 0.0 - 1.3 10e3/uL    Absolute Eosinophils 0.0 0.0 - 0.7 10e3/uL    Absolute Basophils 0.0 0.0 - 0.2 10e3/uL    Absolute Immature Granulocytes 0.0 <=0.4 10e3/uL    Absolute NRBCs 0.0 10e3/uL   Valproic acid (Depakote level)     Status: Abnormal   Result Value Ref Range    Valproic acid 136.0 (H)   ug/mL   CBC with platelets differential     Status: Abnormal    Narrative    The following orders were created for panel order CBC with platelets differential.  Procedure                               Abnormality         Status                     ---------                               -----------         ------                     CBC with platelets and d...[[id number]]  Abnormal            Final result                 Please view results for these tests on the individual orders.     All imaging studies reviewed by me.     Wenceslao Carl MD, FACS

## 2022-12-26 NOTE — PLAN OF CARE
Goal Outcome Evaluation:           Overall Patient Progress: no changeOverall Patient Progress: no change    Outcome Evaluation: Patient this shift alert and oriented, noted to have tremor in all extremities, which patient reports is normal for her. Patient ambulated from bed to bathroom several times this shift assist of 1 with gait belt to higher aspect of trunk, and walker. Patient noted to be showing signs of pain with movement, and having minimal voids. recommended external catheter to promote rest. Received PRN dilaudid 0.2 x 1, added in flexeril dose which helped patient's body to rest. Patient noted to desaturate after doses of dilaudid, kept on 2-2.5 LPM overnight. Patient reported she was hungry, tolerates fluids well, and tolerated a chocolate pudding as well. Patient has a productive cough with some difficulty clearing it at times, has diminished sounds in RLL and clear (after coughing)other lobes. Heart rate greater than 110 this shift. Patient agreeable to plan to utilize PRN's on a scheduled basis to get pain under control.

## 2022-12-26 NOTE — PROGRESS NOTES
Surgery    Subjective:  Patient was sleeping when I my arrival but was arousable.  Pain is controlled.  Was seen by PT who recommended rehab placement.  I discussed with the patient she wants to go home.  No shortness of breath.  Feels well otherwise.    Objective:  Vitals:    12/26/22 0824 12/26/22 1120 12/26/22 1122 12/26/22 1135   BP:    115/80   BP Location:       Patient Position:       Cuff Size:       Pulse:    107   Resp:    16   Temp:    98.4  F (36.9  C)   TempSrc:    Oral   SpO2: 98% (!) 87% 93% 99%   Weight:       Height:         Chest: CTA    Results for orders placed or performed during the hospital encounter of 12/25/22 (from the past 24 hour(s))   CBC with platelets differential    Narrative    The following orders were created for panel order CBC with platelets differential.  Procedure                               Abnormality         Status                     ---------                               -----------         ------                     CBC with platelets and d...[[id number]]  Abnormal            Final result                 Please view results for these tests on the individual orders.   INR   Result Value Ref Range    INR 1.03 0.85 - 1.15   Comprehensive metabolic panel   Result Value Ref Range    Sodium 137 133 - 144 mmol/L    Potassium 4.5 3.4 - 5.3 mmol/L    Chloride 102 94 - 109 mmol/L    Carbon Dioxide (CO2) 29 20 - 32 mmol/L    Anion Gap 6 3 - 14 mmol/L    Urea Nitrogen 20 7 - 30 mg/dL    Creatinine 0.67 0.52 - 1.04 mg/dL    Calcium 9.1 8.5 - 10.1 mg/dL    Glucose 105 (H) 70 - 99 mg/dL    Alkaline Phosphatase 53 40 - 150 U/L    AST 12 0 - 45 U/L    ALT 9 0 - 50 U/L    Protein Total 6.3 (L) 6.8 - 8.8 g/dL    Albumin 3.1 (L) 3.4 - 5.0 g/dL    Bilirubin Total 0.4 0.2 - 1.3 mg/dL    GFR Estimate >90 >60 mL/min/1.73m2   Lactic acid whole blood   Result Value Ref Range    Lactic Acid 1.0 0.7 - 2.0 mmol/L   Troponin I   Result Value Ref Range    Troponin I High Sensitivity 6 <54 ng/L    Blood gas venous   Result Value Ref Range    pH Venous 7.33 7.32 - 7.43    pCO2 Venous 58 (H) 40 - 50 mm Hg    pO2 Venous 21 (L) 25 - 47 mm Hg    Bicarbonate Venous 31 (H) 21 - 28 mmol/L    Base Excess/Deficit (+/-) 3.3 (H) -7.7 - 1.9 mmol/L    FIO2 21    CBC with platelets and differential   Result Value Ref Range    WBC Count 5.3 4.0 - 11.0 10e3/uL    RBC Count 4.19 3.80 - 5.20 10e6/uL    Hemoglobin 13.6 11.7 - 15.7 g/dL    Hematocrit 42.1 35.0 - 47.0 %     (H) 78 - 100 fL    MCH 32.5 26.5 - 33.0 pg    MCHC 32.3 31.5 - 36.5 g/dL    RDW 13.2 10.0 - 15.0 %    Platelet Count 153 150 - 450 10e3/uL    % Neutrophils 67 %    % Lymphocytes 23 %    % Monocytes 9 %    % Eosinophils 0 %    % Basophils 0 %    % Immature Granulocytes 1 %    NRBCs per 100 WBC 0 <1 /100    Absolute Neutrophils 3.5 1.6 - 8.3 10e3/uL    Absolute Lymphocytes 1.2 0.8 - 5.3 10e3/uL    Absolute Monocytes 0.5 0.0 - 1.3 10e3/uL    Absolute Eosinophils 0.0 0.0 - 0.7 10e3/uL    Absolute Basophils 0.0 0.0 - 0.2 10e3/uL    Absolute Immature Granulocytes 0.0 <=0.4 10e3/uL    Absolute NRBCs 0.0 10e3/uL   Valproic acid (Depakote level)   Result Value Ref Range    Valproic acid 136.0 (H)   ug/mL   CT CHEST W CONTRAST    Narrative    EXAM: CT CHEST W CONTRAST  LOC[facility]: [facility]  DATE/TIME: 12/25/2022 3:38 PM    INDICATION: Fall with right posterior rib pain.  COMPARISON: Chest x-ray on 04/09/2022.  TECHNIQUE: CT chest with IV contrast. Multiplanar reformats were obtained. Dose reduction techniques were used.    CONTRAST: 50mL, Isovue 370    FINDINGS:   LUNGS AND PLEURA: Small right pneumothorax, small layering hemothorax in the right lung base and mild right basilar atelectasis. Mild emphysema. No left pneumothorax or pleural effusion. A 2 mm right upper lobe nodule (4/70).    MEDIASTINUM/AXILLAE: No lymphadenopathy. No pulmonary emboli. No thoracic aortic aneurysm or thoracic aortic injury. No pericardial  effusion.    CORONARY ARTERY CALCIFICATION: Mild.    UPPER ABDOMEN: Few small hepatic cysts. Cholecystectomy. Renal cysts bilaterally.    MUSCULOSKELETAL: Acute displaced and mildly comminuted fractures of the posterior right 9th and 10th ribs. No other displaced fractures.      Impression    IMPRESSION:   1.  Acute displaced and mildly comminuted fractures of the right posterior 9th and 10th ribs. Associated small right-sided pneumothorax and hemothorax.  2.  Mild emphysema.  3.  Right upper lobe 2 mm nodule. See follow-up guidelines.    REFERENCE:  Guidelines for Management of Incidental Pulmonary Nodules Detected on CT Images: From the Fleischner Society 2017.   Guidelines apply to incidental nodules in patients who are 35 years or older.  Guidelines do not apply to lung cancer screening, patients with immunosuppression, or patients with known primary cancer.    SINGLE NODULE  Nodule size <6 mm  Low-risk patients: No follow-up needed.  High-risk patients: Optional follow-up at 12 months.     UA with Microscopic reflex to Culture    Specimen: Urine, Clean Catch   Result Value Ref Range    Color Urine Yellow Colorless, Straw, Light Yellow, Yellow    Appearance Urine Slightly Cloudy (A) Clear    Glucose Urine Negative Negative mg/dL    Bilirubin Urine Negative Negative    Ketones Urine 15 (A) Negative mg/dL    Specific Gravity Urine 1.025 1.003 - 1.035    Blood Urine Large (A) Negative    pH Urine 5.5 5.0 - 7.0    Protein Albumin Urine Negative Negative mg/dL    Urobilinogen Urine Normal Normal, 2.0 mg/dL    Nitrite Urine Negative Negative    Leukocyte Esterase Urine Negative Negative    Mucus Urine Present (A) None Seen /LPF    RBC Urine >182 (H) <=2 /HPF    WBC Urine 1 <=5 /HPF    Squamous Epithelials Urine <1 <=1 /HPF    Narrative    Urine Culture not indicated   XR Chest Port 1 View    Narrative    EXAM: XR CHEST PORT 1 VIEW  LOCATION: [facility]  DATE/TIME: 12/25/2022 4:40  PM    INDICATION: Rib fracture with pneumothorax  COMPARISON: CT performed the same day      Impression    IMPRESSION: Heart normal in size. Right apical pneumothorax better visualized on CT performed the same day. Fractures of the right ninth and 10th ribs are seen on CT. Lungs are clear.   US Renal Complete Non-Vascular    Narrative    EXAM: US RENAL COMPLETE NON-VASCULAR  LOCATION: [facility]  DATE/TIME: 12/25/2022 6:16 PM    INDICATION: Right posterior rib fractures with hematuria  COMPARISON: Ultrasound 7/29/2019, CT 8/9/2021  TECHNIQUE: Routine Bilateral Renal and Bladder Ultrasound.    FINDINGS:    RIGHT KIDNEY: 8.1 x 4.3 x 3.9 cm. Diffusely echogenic renal parenchyma consistent with medical renal disease. No hydronephrosis. No renal injury or perinephric fluid collection evident.     LEFT KIDNEY: 8.5 x 3.7 x 3.5 cm. No hydronephrosis. Diffusely echogenic renal parenchyma. 2.1 cm simple lower pole cyst    BLADDER: Normal.      Impression    IMPRESSION:  1.  Mildly small kidneys with prominent increased echogenicity of parenchyma most suggestive of medical renal disease. No hydronephrosis. No acute abnormality evident.   XR Chest Port 1 View    Narrative    EXAM: XR CHEST PORT 1 VIEW  LOCATION: [facility]  DATE/TIME: 12/25/2022 10:16 PM    INDICATION: f u apical pneumothorax  COMPARISON: Earlier today at 1621 hours.      Impression    IMPRESSION: No visible pneumothorax. Increased atelectasis at right lung base. Lungs otherwise clear. Heart size normal.   XR Chest Port 1 View    Narrative    EXAM: XR CHEST PORT 1 VIEW  LOCATION: [facility]  DATE/TIME: 12/26/2022 6:20 AM    INDICATION: Pt with small PTX  COMPARISON: 12/25/2022      Impression    IMPRESSION: The previously seen left apical pneumothorax is not appreciated on the current study. Basilar atelectasis is again noted, unchanged   Comprehensive metabolic  panel   Result Value Ref Range    Sodium 136 133 - 144 mmol/L    Potassium 4.4 3.4 - 5.3 mmol/L    Chloride 104 94 - 109 mmol/L    Carbon Dioxide (CO2) 26 20 - 32 mmol/L    Anion Gap 6 3 - 14 mmol/L    Urea Nitrogen 12 7 - 30 mg/dL    Creatinine 0.42 (L) 0.52 - 1.04 mg/dL    Calcium 8.4 (L) 8.5 - 10.1 mg/dL    Glucose 69 (L) 70 - 99 mg/dL    Alkaline Phosphatase 42 40 - 150 U/L    AST 17 0 - 45 U/L    ALT 10 0 - 50 U/L    Protein Total 5.4 (L) 6.8 - 8.8 g/dL    Albumin 2.5 (L) 3.4 - 5.0 g/dL    Bilirubin Total 0.5 0.2 - 1.3 mg/dL    GFR Estimate >90 >60 mL/min/1.73m2   CBC with platelets   Result Value Ref Range    WBC Count 5.9 4.0 - 11.0 10e3/uL    RBC Count 3.61 (L) 3.80 - 5.20 10e6/uL    Hemoglobin 11.8 11.7 - 15.7 g/dL    Hematocrit 37.1 35.0 - 47.0 %     (H) 78 - 100 fL    MCH 32.7 26.5 - 33.0 pg    MCHC 31.8 31.5 - 36.5 g/dL    RDW 13.4 10.0 - 15.0 %    Platelet Count 119 (L) 150 - 450 10e3/uL   Glucose by meter   Result Value Ref Range    GLUCOSE BY METER POCT 103 (H) 70 - 99 mg/dL         Assessment/plan:  This is a 62 lady status post a fall onto a railing with right-sided rib fractures and a small hemopneumothorax.  That appears to resolved on today's chest x-ray.  Patient is clinically improved.  She does have generalized weakness and deconditioning which was present prior to her fall.  She is also has evidence of malnutrition with an albumin of 2.5.  She did have a PT evaluation today that recommended rehab placement.  The patient does not want this.  Plan at this time is to await the social work evaluation for placement options.  Continue PT.  Obtain a nutrition evaluation due to her malnutrition.  We will continue incentive spirometry and oxygen as needed.    Wenceslao Carl MD, FACS

## 2022-12-26 NOTE — PROGRESS NOTES
CLINICAL NUTRITION SERVICES - ASSESSMENT NOTE     Nutrition Prescription    RECOMMENDATIONS FOR MDs/PROVIDERS TO ORDER:  Advance diet as tolerated    Malnutrition Status:    Unable to determine due to lack of NFPE - plan to meet with pt as able pending diet advancement     Recommendations already ordered by Registered Dietitian (RD):  Will offer Ensure clear apple or mixed berry BID with breakfast and dinner to supplement oral intake. Pt is on clear liquids right now - will meet with pt as able to determine supplement tolerance, offer wider variety of supplementation once pt transitions to full/regular diet.     Future/Additional Recommendations:  Will follow to monitor diet advancement, oral intake, weight changes, GI symptoms/BMs, and nutritional supplement tolerance  RD will meet with pt as able pending diet advancement to offer additional supplementation and conduct NFPE     REASON FOR ASSESSMENT  Jemima Yuen is a/an 62 year old female assessed by the dietitian via provider order - Pt with malnutrition    NUTRITION HISTORY  Pt admitted with moderate to severe right-sided pain after fall prior to arriving in ED. Pt recalls slipping on ice but  states that she fell over as he was walking behind her. Pt presents with extreme weakness that has been going on for a while, has not been doing much activity at home. Recently had knee surgery 8 or 9 months ago but did not go to physical therapy or rehab per .    Dxhx of tobacco use, COPD, bilateral lower extremity edema, bronchitis, osteoporosis, fall, closed fracture of multiple ribs of right side, HTN, hyperlipidemia, ovarian cyst, epilepsy convulsions, sepsis due to escherichia coli, acute pylonephritis    No past RD notes available in chart.     CURRENT NUTRITION ORDERS  Diet: Orders Placed This Encounter      Advance Diet as Tolerated: Clear Liquid Diet    Intake/Tolerance: 0% as pt is transitioning to clear liquids, advancing as able. Per RN note,  "pt reportedly is hungry and has been tolerating fluids well, did have a chocolate pudding. Gi is WDL, last BM was 12/24. Has not had a BM yet since admission 12/25.     LABS  Labs reviewed - BG mildly elevated, did have a slightly low BG as well at 69 earlier today     MEDICATIONS  Medications reviewed - colace, pepcid, dextrose IVF at 50 mL/hr = 1,200 mL per day    ANTHROPOMETRICS  Height: 144.8 cm (4' 9\")  Most Recent Weight: 41.1 kg (90 lb 9.6 oz) via method unknown, assumed bed scale  IBW: 85 lbs  BMI: Normal BMI - lower end at 19.61  Weight History:   Wt Readings from Last 15 Encounters:   12/26/22 41.1 kg (90 lb 9.6 oz)   01/15/22 45.4 kg (100 lb)   08/08/21 44 kg (97 lb)   09/29/20 47.2 kg (104 lb)   07/28/19 49.3 kg (108 lb 11 oz)   12/03/14 45.4 kg (100 lb)   06/04/14 48.5 kg (107 lb)   06/03/14 48.5 kg (107 lb)   05/22/14 48.1 kg (106 lb)   02/12/13 48.1 kg (106 lb)   06/20/12 51.7 kg (114 lb)   06/06/12 52.2 kg (115 lb)   03/15/11 46.5 kg (102 lb 9.6 oz)   01/06/11 44.9 kg (99 lb)   11/30/10 46.2 kg (101 lb 12.8 oz)   Limited recent weight hx available.   Per office visit on 5/11/22, pt was 90 lbs  4/18/22 pt was 91 lbs via office visit     13.5% weight loss in approx. 2 years and 3 months (not considered significant for malnutrition)    Dosing Weight: 41.1 kg using actual body weight    ASSESSED NUTRITION NEEDS  Estimated Energy Needs: 1,028-1,233 kcals/day (25 - 30 kcals/kg)  Justification: Increased needs and Repletion  Estimated Protein Needs: 49-62 grams protein/day (1.2 - 1.5 grams of pro/kg)  Justification: Hypercatabolism with acute illness, Increased needs and Repletion  Estimated Fluid Needs: 1,233 mL/day (30 mL/kg)   Justification: Maintenance    PHYSICAL FINDINGS  See malnutrition section below.    MALNUTRITION  % Intake: Unable to assess  % Weight Loss: Weight loss does not meet criteria  Subcutaneous Fat Loss: Unable to assess  Muscle Loss: Unable to assess  Fluid Accumulation/Edema: Does " not meet criteria - BLE trace edema 1+ in ankles  Malnutrition Diagnosis: Unable to determine due to lack of NFPE - RD will meet with pt as able     NUTRITION DIAGNOSIS  Inadequate oral intake related to acute on chronic illness, weakness as evidenced by current clear liquid status and overall weight loss of 13.5% in approx. 2 years and 3 months     INTERVENTIONS  Implementation  Nutrition Education: Will be provided if education needs arise     Collaboration with other providers  Medical food supplement therapy - will offer Ensure clear (any flavor) BID with breakfast and dinner to supplement intake while diet advances. Will meet with pt and discuss other supplement options, other interventions once pt transitions beyond clear liquids.     Goals  Diet adv v nutrition support as soon as possible  Patient to consume 50-75% of nutritionally adequate meal trays TID, or the equivalent with supplements/snacks  Pt weight will remain stable during admission  Pt will tolerate nutritional supplement      Monitoring/Evaluation  Progress toward goals will be monitored and evaluated per protocol.    Nichelle Reyes RD, LD  Clinical Dietitian  Office: [phone]  Weekend pager: [phone]

## 2022-12-26 NOTE — UTILIZATION REVIEW
[facility] Utilization Review  Admission Status; Secondary Review Determination     Admission Date: 12/25/2022  2:13 PM      Under the authority of the Utilization Management Committee, the utilization review process indicated a secondary review on the above patient.  The review outcome is based on review of the medical records, discussions with staff, and applying clinical experience noted on the date of the review.        (X)      Inpatient Status Appropriate - This patient's medical care is consistent with medical management for inpatient care and reasonable inpatient medical practice.          RATIONALE FOR DETERMINATION   62-year-old female admitted after a fall onto a railing with right-sided rib fractures and small hemopneumothorax which appears to be resolved on today's chest x-ray.  Patient has generalized weakness and deconditioning with low albumin.  Patient needs ongoing pain control, incentive spirometry and oxygen, nutrition evaluation for malnutrition.  Patient had episode of hypoglycemia and received glucose gel, was able to wean off oxygen but then developed hypoxia, replaced 2 L via nasal cannula.  Complex patient admitted with fall and rib fractures, now developed acute hypoxic respiratory failure, needs close monitoring in the hospital with ongoing interventions, recommend continue inpatient status      The severity of illness, intensity of service provided, expected LOS and risk for adverse outcome make the care complex, high risk and appropriate for hospital admission.The patient requires hospital based medical care which is anticipated to require a stay of 2 or more midnights; according to CMS guidelines the patient should be admitted as inpatient        The information on this document is developed by the utilization review team in order for the business office to ensure compliance.  This only denotes the appropriateness of proper admission status and does not reflect the quality  of care rendered.         The definitions of Inpatient Status and Observation Status used in making the determination above are those provided in the CMS Coverage Manual, Chapter 1 and Chapter 6, section 70.4.      Sincerely,       Karen Martinez MD  Physician Advisor  Utilization Review-[place]    Phone: [phone]

## 2022-12-26 NOTE — PLAN OF CARE
S-(situation): Patient arrives to room 270 via cart from ED.    B-(background): Patient had fall, has right sided pain since fall. Had right knee surgery within last year, did not go to TCU after surgery for rehab. Lives at home with  who is a  and is gone for days at a time, but has son and nephew that live close by and check on her.     A-(assessment): Patient alert and oriented, mildly anxious. Noted to have a generalized tremor that patient reports is baseline. Tachycardic, pulses running 110's. Has lidocaine patch to right mid back, and nicotine patch on right arm. Lungs dim in right lower lobe otherwise clear to auscultation. No edema. Patient has scattered bruising noted, and mild blanchable redness to coccyx, and redness to nose. Patient on 2-2.5 LPM nasal cannula, reports easier to breath with HOB elevated. Patient noted to be moaning intermittently with movement, but when asked patient states she is not having pain. Patient declined offer for pain interventions. Patient reports last seizure she had was in 2005, managed with depakote.     R-(recommendations): Orders reviewed with patient. Will monitor patient per MD orders.     Inpatient nursing criteria listed below were met:    Health care directives status obtained and documented: Yes  VTE ordered/documented: Yes  Skin issues/needs documented:Yes  Isolation addressed and Signage used: NA  Fall Prevention: Care plan updated Yes Education given and documented Yes  Care Plan initiated and Co-Morbidities added: Yes  Education Assessment documented:Yes  Admission Education Documented: Yes  If present CAUTI/CLABI Education done: NA  New medication patient education completed and documented (Possible Side Effects of Common Medications handout): Yes  Allergies Reviewed: Yes  Admission Medication Reconciliation completed: Yes  Home medications if not able to send immediately home with family stored here: NA  Reminder note placed in discharge  instructions regarding home meds: NA  Individualized care needs/preferences addressed and charted: Yes  Provider Notified that patient has arrived to the unit: No, was seen by Dr. Carl in ED.

## 2022-12-26 NOTE — CONSULTS
"[facility]  Consult Note - Hospitalist Service  Date of Admission:  12/25/2022  Consult Requested by: Wenceslao Howell   Reason for Consult: Medical management.    Assessment & Plan   Jemima Yuen is a 62 year old female admitted on 12/25/2022 for right sided rib fractures and pneumohemothorax.      Mechanical fall with right sided rib fractures and pneumohemothorax.  Being managed by surgical team.  Pain control and monitor respiratory/hemodynamic status per primary surgical team.    History of seizure.  Per the patient's report, last seizure activity was back in 2005.  The patient has been complaint with taking her Depakote as instructed daily.  The patient denies any recent episode of seizure and states that her fall was simply a \"slip\" on ice.  Resume home Depakote.    DVT PPx per primary team    Thank you for your consult.  Please feel free to contact medicine service for any other questions or concern.       Quintin Carter MD  [facility]  Securely message with the Vocera Web Console (learn more here)  Text page via [facility] Paging/Directory       Hospitalist Service    Clinically Significant Risk Factors Present on Admission     ______________________________________________________________________    Chief Complaint    Fall with back/chest pain.    History is obtained from the patient    History of Present Illness   Jemima Yuen is a 62 year old female with past medical history of seizure and no other reported past medical history presents with complaints with a fall with back and chest pain.  Per the patient's report, the patient was outside when she accidentally slipped on ice and fell backward hitting the right side of her back onto a metal rail.  The patient denies any seizure activity and states she did not hit her head or LOC.  The patient also denies any recent fever, chills, chest pain, nausea, vomiting or diarrhea.  The patient " states she is otherwise health except for seizure history.    In our ER, CT chest shows right 9th and 10th rib fractures and small right sided pneumohemothorax.  Trauma surgery admit the patient as primary and medicine service to consult on medical management.  Valproic acid level 136.    Review of Systems   The 10 point Review of Systems is negative other than noted in the HPI or here.     Past Medical History    I have reviewed this patient's medical history and updated it with pertinent information if needed.   Past Medical History:   Diagnosis Date     Excessive or frequent menstruation      Headache(784.0)      Hypertension 3/17/2009     Other convulsions     Epilepsy     Other unspecified back disorder     ?sacroiliitis, ?sciatica, chronic intermittent symptoms       Past Surgical History   I have reviewed this patient's surgical history and updated it with pertinent information if needed.  Past Surgical History:   Procedure Laterality Date     COLONOSCOPY  2011    Procedure:COMBINED COLONOSCOPY, SINGLE BIOPSY/POLYPECTOMY BY BIOPSY; Surgeon:PARRISH PINON; Location: GI     HC KNEE SCOPE, DIAGNOSTIC  10/95,     Arthroscopy,  Right Knee x 2     HC REPAIR INTERCARP/CARP-METACARP JT  08    left wrist     ZZC LIGATE FALLOPIAN TUBE,POSTPARTUM      Tubal Ligation       Social History   I have reviewed this patient's social history and updated it with pertinent information if needed.  Social History     Tobacco Use     Smoking status: Every Day     Packs/day: 0.25     Types: Cigarettes     Last attempt to quit: 2011     Years since quittin.8     Smokeless tobacco: Never     Tobacco comments:     no smokers in household   Substance Use Topics     Alcohol use: Yes     Comment: dennis x 1/<1x per yr     Drug use: No     Comment: Marijuana in past/nothing since        Family History   I have reviewed this patient's family history and updated it with pertinent information if  needed.  Family History   Problem Relation Age of Onset     Neurologic Disorder Mother         epilepsy       Medications   I have reviewed this patient's current medications    Allergies   Allergies   Allergen Reactions     Codeine Itching     Percocet [Oxycodone-Acetaminophen] GI Disturbance     Vicodin [Hydrocodone-Acetaminophen] GI Disturbance       Physical Exam   Vital Signs: Temp: 98.3  F (36.8  C) Temp src: Oral BP: (!) 149/90 Pulse: 111   Resp: 16 SpO2: 92 % O2 Device: None (Room air) Oxygen Delivery: 2 LPM  Weight: 90 lbs 9.6 oz      GENERAL: The patient is not in any acute distressed. Awake and alert. On 1L of O2 per NC  HEENT: Nonicteric sclerae, PERRLA, EOMI. Oropharynx clear. Moist mucous membranes. Conjunctivae appear well perfused.  HEART: Regular rate and rhythm without murmurs. No lower extremities edema.  LUNGS: Clear to auscultation bilaterally. No wheezing, crackles or rhonchi  ABDOMEN: Soft, positive bowel sounds, nontender.  SKIN: No rash, no excessive bruising, petechiae, or purpura.  NEUROLOGIC: AxO x 3.  Cranial nerves II-XII intact without motor/sensory deficit.      Data   I personally reviewed no images or EKG's today.      - As the provider of this telehealth evaluation, requested by the patient's evaluating physician, I attest that I introduced myself to the patient, provided my credentials and determined that telemedicine via a real-time, 2 way interactive audio and video platform is an appropriate and effective means of providing this service.    - I reviewed the patient's chart and had a discussion with the member of the patient's treatment team.    - The patient and I mutually agreed with continuation of this evaluation via telemedicine.  The patient consented for the telemedicine evaluation.  - This virtual encounter was taken place from [place].  The encounter was approximately 35 minutes.  The nurse was present during the entire time of the encounter and was able to move the  stethoscope in appropriate directions.  The patient was evaluated at [facility].

## 2022-12-26 NOTE — PLAN OF CARE
Goal Outcome Evaluation:      Plan of Care Reviewed With: patient    Overall Patient Progress: no changeOverall Patient Progress: no change    Outcome Evaluation: Pt is A & O x 4, lethargic most of day and sleeping most of day. Blood sugar noted to be 69 with morning labs, glucose gel given, up to 103. Pt up to the bathroom with x 1 assist with walker/GB, x 1 large incontinent void and x 1 continent. Ambulating caused pt lots of pain, prn dilaudid given x 1. Pt transitioned to RA earlier in morning but once back in a deep sleep, 02 down to 87%, Put back on 2 L NC. Denies shortness of breathe, crackles noted to LLL.  Trace edema noted to BLE. Nicotine patch to left arm. New IV placed to left lower arm this shift, IV fluids infuing per MAR.

## 2022-12-26 NOTE — PROGRESS NOTES
12/26/22 1000   Appointment Info   Signing Clinician's Name / Credentials (PT) Jim Lux, PT, DPT, OCS   Rehab Comments (PT) From home with spouse, uses walker, pneumothorax.   Living Environment   People in Home spouse   Current Living Arrangements house   Home Accessibility wheelchair accessible   Number of Stairs, Main Entrance   (ramp)   Transportation Anticipated family or friend will provide   Self-Care   Usual Activity Tolerance fair   Current Activity Tolerance poor   Regular Exercise No   Equipment Currently Used at Home walker, rolling   Fall history within last six months yes   Number of times patient has fallen within last six months 3   General Information   Onset of Illness/Injury or Date of Surgery 12/25/22   Referring Physician Dr. Carl   Patient/Family Therapy Goals Statement (PT) Return home   Pertinent History of Current Problem (include personal factors and/or comorbidities that impact the POC) Pt is a 62 fell when leaving her home on her deck steps and hit the R side of her chest.  Pt sustained a pneumo hemothorax and has decreased activity tolerance, weakness, and shortness of breath.   Weight-Bearing Status - LUE full weight-bearing   Weight-Bearing Status - RUE full weight-bearing   Weight-Bearing Status - LLE full weight-bearing   Weight-Bearing Status - RLE full weight-bearing   Heart Disease Risk Factors Smoking;Lack of physical activity;Medical history   General Observations Frail appearing woman, low BMI, short of stature.   Cognition   Affect/Mental Status (Cognition) low arousal/lethargic;WFL   Orientation Status (Cognition) oriented x 3   Follows Commands (Cognition) WFL   Pain Assessment   Patient Currently in Pain Yes, see Vital Sign flowsheet   Integumentary/Edema   Integumentary/Edema no deficits were identifed   Posture    Posture Forward head position;Protracted shoulders   Range of Motion (ROM)   Range of Motion ROM is WFL   Strength (Manual Muscle Testing)   Strength  Comments LE's globally 3+/5, painful.   Bed Mobility   Bed Mobility supine-sit;sit-supine   Supine-Sit [place] (Bed Mobility) moderate assist (50% patient effort)   Sit-Supine [place] (Bed Mobility) maximum assist (25% patient effort)   Transfers   Transfers sit-stand transfer;toilet transfer   Sit-Stand Transfer   Sit-Stand [place] (Transfers) moderate assist (50% patient effort)   Assistive Device (Sit-Stand Transfers) walker, front-wheeled   Toilet Transfer   [place] Level (Toilet Transfer) contact guard   Assistive Device (Toilet Transfer) raised toilet seat;grab bars/safety frame   Type (Toilet Transfer) stand-sit   Gait/Stairs (Locomotion)   [place] Level (Gait) contact guard   Assistive Device (Gait) walker, front-wheeled   Distance in Feet 10,10   Pattern (Gait) step-through   Deviations/Abnormal Patterns (Gait) base of support, wide;gait speed decreased   Balance   Balance Comments Able to maintain upright posture with holding onto 2WW.   Sensory Examination   Sensory Perception patient reports no sensory changes   Coordination   Coordination no deficits were identified   Muscle Tone   Muscle Tone no deficits were identified   Clinical Impression   Criteria for Skilled Therapeutic Intervention Yes, treatment indicated   PT Diagnosis (PT) Generalized weakness   Influenced by the following impairments Pain, weakness, impaired balance   Functional limitations due to impairments Walking, standing, transfers   Clinical Presentation (PT Evaluation Complexity) Stable/Uncomplicated   Clinical Presentation Rationale Clinical judgement   Clinical Decision Making (Complexity) low complexity   Planned Therapy Interventions (PT) balance training;bed mobility training;gait training;neuromuscular re-education;patient/family education;postural re-education;ROM (range of motion);strengthening;stretching   Risk & Benefits of therapy have been explained evaluation/treatment results reviewed;care  plan/treatment goals reviewed;risks/benefits reviewed;current/potential barriers reviewed;participants voiced agreement with care plan;participants included;patient   Clinical Impression Comments Pt is a 62 y.o. female who was seen for PT evaluation due to weakness and pain secondary to fall at home.  Pt will benefit from skilled PT to improve strength and function in order to return to prior living environment and to assist with safe discharge planning.   PT Total Evaluation Time   PT Eval, Low Complexity Minutes (88902) 15   Physical Therapy Goals   PT Frequency Daily   PT Predicted Duration/Target Date for Goal Attainment 01/02/23   PT Goals Bed Mobility;Transfers;Gait   PT: Bed Mobility Independent   PT: Transfers Independent   PT: Gait Supervision/stand-by assist;Rolling walker;100 feet   Interventions   Interventions Quick Adds Therapeutic Activity   Therapeutic Activity   Therapeutic Activities: dynamic activities to improve functional performance Minutes (96403) 20   Symptoms Noted During/After Treatment Fatigue;Increased pain;Shortness of breath   Treatment Detail/Skilled Intervention Pt supine in bed upon PT arrival.  Pt transferred supine to sit with mod assistance, head of bed elevated, and use of railing.  Pt transferred sit to stand with min-mod assist with use of 2WW.  Pt ambulated 10 feet to bathroom with slow gait pattern with CGA with use of 2WW.  Pt performed toilet transfer sit to and from stand with CGA.  Pt ambulated back to bed with 2WW and CGA.  Pt transferred back to supine with mod assist.  Call light and needs placed within reach upon PT departure, bed alarm activated.   PT Discharge Planning   PT Plan Bed mobility, transfers, ambulation   PT Discharge Recommendation (DC Rec) Transitional Care Facility;home with home care physical therapy;home with assist   PT Rationale for DC Rec Pt from home with spouse with no stairs to enter home.  Pt sleeps on couch as she has not been able to ascend  stairs at baseline.  Pt is alone most of the day while her spouse is working.  Pt would require 24-7 assist to assist with bed mobility, transfers, and mobility in order to safely return home, Home health PT would also be recommended to improve strength and activity tolerance.  Pt otherwise would benefit from TCU in order to improve strength and activity tolerance to mobilize safely at home.   PT Brief overview of current status mod assist with bed mobility, mod assist with transfer, ambulation in room 2 x 10 feet to toilet with CGA and 2WW.   Total Session Time   Timed Code Treatment Minutes 20   Total Session Time (sum of timed and untimed services) 35

## 2022-12-26 NOTE — ED PROVIDER NOTES
62-year-old female signed out to me at change of shift awaiting repeat chest x-ray for admission.  She has remained stable.  Chest x-ray without any visible significant pneumothorax.  Oxygen saturations are stable on room air.  She needs admission primarily for disposition as she is too weak at home per .  He reports since her knee surgery 8 to 9 months ago she has become more and more debilitated.  The hospitalist service was not comfortable admitting for trauma.  Fortunately, Dr. Davon kirk with the surgery team was gracious enough to come in and get her admitted tonight.     Steven Malik MD  12/25/22 [id number]

## 2022-12-26 NOTE — CONSULTS
Care Management Initial Consult    General Information  Assessment completed with: Patient, Spouse or significant other, patient  Dajuan  Type of CM/SW Visit: Initial Assessment    Primary Care Provider verified and updated as needed: Yes   Readmission within the last 30 days: no previous admission in last 30 days      Reason for Consult: discharge planning, facility placement  Advance Care Planning:            Communication Assessment  Patient's communication style: spoken language (English or Bilingual)    Hearing Difficulty or Deaf: no   Wear Glasses or Blind: no    Cognitive  Cognitive/Neuro/Behavioral: .WDL except, level of consciousness  Level of Consciousness: lethargic  Arousal Level: arouses to touch/gentle shaking  Orientation: oriented x 4  Mood/Behavior: calm, cooperative  Best Language: 0 - No aphasia  Speech: clear    Living Environment:   People in home: spouse  Dajuan  Current living Arrangements: house      Able to return to prior arrangements: no       Family/Social Support:  Care provided by: self, spouse/significant other  Provides care for: no one, unable/limited ability to care for self  Marital Status:   , Children  Dajuan       Description of Support System: Supportive, Involved (not able to be there 24/7)    Support Assessment: Lacks adequate physical care    Current Resources:   Patient receiving home care services: No     Community Resources: None  Equipment currently used at home: walker, rolling  Supplies currently used at home: None    Employment/Financial:  Employment Status: unemployed        Financial Concerns: No concerns identified   Referral to Financial Worker: No       Lifestyle & Psychosocial Needs:  Social Determinants of Health     Tobacco Use: High Risk     Smoking Tobacco Use: Every Day     Smokeless Tobacco Use: Never     Passive Exposure: Not on file   Alcohol Use: Not on file   Financial Resource Strain: Not on file   Food Insecurity: Not on file    Transportation Needs: Not on file   Physical Activity: Not on file   Stress: Not on file   Social Connections: Not on file   Intimate Partner Violence: Not on file   Depression: Not on file   Housing Stability: Not on file       Functional Status:  Prior to admission patient needed assistance:   Dependent ADLs:: Ambulation-walker, Bathing, Eating, Positioning, Toileting  Dependent IADLs:: Cleaning, Cooking, Laundry, Shopping, Meal Preparation  Assesssment of Functional Status: Not at baseline with mobility, Not at baseline with ADL Functioning    Mental Health Status:          Chemical Dependency Status:                Values/Beliefs:  Spiritual, Cultural Beliefs, Confucianist Practices, Values that affect care:                 Additional Information:  Writer met with patient in  The room introduced self and role.Writer informed patient of recommendations of a TCU stay after discharge from Doctor and PT patient refused and said she could go home with  and he could stay with her 24/7 and she would agree to [facility]. Writer asked patient permission to speak with  and patient said yes. Writer spoke with patient's  Dajuan and son Tez who informed writer they could not be with patient 24/7 and that it was important that she go to a rehab facility to improve her mobility. Dajuan stated patient has recently had knee surgery and did not do PT at home and has relied on him for her ADL's cooking shopping cleaning since surgery, he believes it is important for patient to go to rehab.     Writer will readdress with patient, vendor list of Medicare certified rehabs given to patient during prior visit.    [id number] Writer talked with patient who was in agreement and gave permission to send out referrals. Writer informed patient her  would be stopping by later. Patient is sleepy but did acknowledge the visit.  Brook Cavazos RN   Inpatient Care Coordinator  [facility] [phone]  [facility]  [facility] [phone]    Brook Cavazos RN

## 2022-12-27 ENCOUNTER — APPOINTMENT (OUTPATIENT)
Dept: PHYSICAL THERAPY | Facility: CLINIC | Age: 62
DRG: 200 | End: 2022-12-27
Payer: MEDICARE

## 2022-12-27 LAB
ERYTHROCYTE [DISTWIDTH] IN BLOOD BY AUTOMATED COUNT: 13.6 % (ref 10–15)
GLUCOSE BLDC GLUCOMTR-MCNC: 100 MG/DL (ref 70–99)
HCT VFR BLD AUTO: 40.4 % (ref 35–47)
HGB BLD-MCNC: 12.2 G/DL (ref 11.7–15.7)
MCH RBC QN AUTO: 32.6 PG (ref 26.5–33)
MCHC RBC AUTO-ENTMCNC: 30.2 G/DL (ref 31.5–36.5)
MCV RBC AUTO: 108 FL (ref 78–100)
PLATELET # BLD AUTO: 86 10E3/UL (ref 150–450)
PROCALCITONIN SERPL IA-MCNC: 0.04 NG/ML
RBC # BLD AUTO: 3.74 10E6/UL (ref 3.8–5.2)
WBC # BLD AUTO: 6.6 10E3/UL (ref 4–11)

## 2022-12-27 PROCEDURE — 99232 SBSQ HOSP IP/OBS MODERATE 35: CPT | Performed by: SPECIALIST

## 2022-12-27 PROCEDURE — 250N000011 HC RX IP 250 OP 636: Performed by: INTERNAL MEDICINE

## 2022-12-27 PROCEDURE — 250N000013 HC RX MED GY IP 250 OP 250 PS 637: Performed by: EMERGENCY MEDICINE

## 2022-12-27 PROCEDURE — 84145 PROCALCITONIN (PCT): CPT | Performed by: PEDIATRICS

## 2022-12-27 PROCEDURE — 85027 COMPLETE CBC AUTOMATED: CPT | Performed by: NURSE PRACTITIONER

## 2022-12-27 PROCEDURE — 250N000013 HC RX MED GY IP 250 OP 250 PS 637: Performed by: SPECIALIST

## 2022-12-27 PROCEDURE — 97530 THERAPEUTIC ACTIVITIES: CPT | Mod: GP | Performed by: PHYSICAL THERAPIST

## 2022-12-27 PROCEDURE — 250N000013 HC RX MED GY IP 250 OP 250 PS 637: Performed by: INTERNAL MEDICINE

## 2022-12-27 PROCEDURE — 36415 COLL VENOUS BLD VENIPUNCTURE: CPT | Performed by: NURSE PRACTITIONER

## 2022-12-27 PROCEDURE — 36415 COLL VENOUS BLD VENIPUNCTURE: CPT | Performed by: PEDIATRICS

## 2022-12-27 PROCEDURE — 250N000011 HC RX IP 250 OP 636: Performed by: NURSE PRACTITIONER

## 2022-12-27 PROCEDURE — 258N000003 HC RX IP 258 OP 636: Performed by: SPECIALIST

## 2022-12-27 PROCEDURE — 120N000001 HC R&B MED SURG/OB

## 2022-12-27 PROCEDURE — 99232 SBSQ HOSP IP/OBS MODERATE 35: CPT | Performed by: NURSE PRACTITIONER

## 2022-12-27 RX ORDER — PIPERACILLIN SODIUM, TAZOBACTAM SODIUM 3; .375 G/15ML; G/15ML
3.38 INJECTION, POWDER, LYOPHILIZED, FOR SOLUTION INTRAVENOUS EVERY 6 HOURS
Status: DISCONTINUED | OUTPATIENT
Start: 2022-12-27 | End: 2022-12-27

## 2022-12-27 RX ORDER — CEFTRIAXONE 1 G/1
1 INJECTION, POWDER, FOR SOLUTION INTRAMUSCULAR; INTRAVENOUS EVERY 24 HOURS
Status: COMPLETED | OUTPATIENT
Start: 2022-12-27 | End: 2022-12-28

## 2022-12-27 RX ORDER — DIPHENHYDRAMINE HCL 25 MG
25 CAPSULE ORAL EVERY 6 HOURS PRN
Status: DISCONTINUED | OUTPATIENT
Start: 2022-12-27 | End: 2022-12-28 | Stop reason: HOSPADM

## 2022-12-27 RX ORDER — FAMOTIDINE 20 MG/1
20 TABLET, FILM COATED ORAL 2 TIMES DAILY
Status: DISCONTINUED | OUTPATIENT
Start: 2022-12-27 | End: 2022-12-28 | Stop reason: HOSPADM

## 2022-12-27 RX ORDER — DIPHENHYDRAMINE HYDROCHLORIDE 50 MG/ML
25 INJECTION INTRAMUSCULAR; INTRAVENOUS EVERY 6 HOURS PRN
Status: DISCONTINUED | OUTPATIENT
Start: 2022-12-27 | End: 2022-12-28 | Stop reason: HOSPADM

## 2022-12-27 RX ADMIN — MICONAZOLE NITRATE: 20 POWDER TOPICAL at 04:24

## 2022-12-27 RX ADMIN — DOCUSATE SODIUM 100 MG: 100 CAPSULE, LIQUID FILLED ORAL at 22:25

## 2022-12-27 RX ADMIN — NICOTINE 1 PATCH: 21 PATCH, EXTENDED RELEASE TRANSDERMAL at 08:35

## 2022-12-27 RX ADMIN — MICONAZOLE NITRATE: 20 POWDER TOPICAL at 22:25

## 2022-12-27 RX ADMIN — CYCLOBENZAPRINE 10 MG: 10 TABLET, FILM COATED ORAL at 08:35

## 2022-12-27 RX ADMIN — DIVALPROEX SODIUM 500 MG: 250 TABLET, EXTENDED RELEASE ORAL at 22:25

## 2022-12-27 RX ADMIN — FAMOTIDINE 20 MG: 20 TABLET, FILM COATED ORAL at 08:35

## 2022-12-27 RX ADMIN — DOCUSATE SODIUM 100 MG: 100 CAPSULE, LIQUID FILLED ORAL at 08:35

## 2022-12-27 RX ADMIN — OXYCODONE HYDROCHLORIDE 5 MG: 5 TABLET ORAL at 08:35

## 2022-12-27 RX ADMIN — OXYCODONE HYDROCHLORIDE 5 MG: 5 TABLET ORAL at 22:25

## 2022-12-27 RX ADMIN — HYDROMORPHONE HYDROCHLORIDE 0.3 MG: 1 INJECTION, SOLUTION INTRAMUSCULAR; INTRAVENOUS; SUBCUTANEOUS at 05:38

## 2022-12-27 RX ADMIN — OXYCODONE HYDROCHLORIDE 2.5 MG: 5 TABLET ORAL at 00:58

## 2022-12-27 RX ADMIN — OXYCODONE HYDROCHLORIDE 2.5 MG: 5 TABLET ORAL at 15:36

## 2022-12-27 RX ADMIN — CEFTRIAXONE SODIUM 1 G: 1 INJECTION, POWDER, FOR SOLUTION INTRAMUSCULAR; INTRAVENOUS at 13:08

## 2022-12-27 RX ADMIN — DEXTROSE AND SODIUM CHLORIDE: 5; 900 INJECTION, SOLUTION INTRAVENOUS at 19:29

## 2022-12-27 RX ADMIN — CYCLOBENZAPRINE 10 MG: 10 TABLET, FILM COATED ORAL at 22:25

## 2022-12-27 RX ADMIN — DIPHENHYDRAMINE HYDROCHLORIDE 25 MG: 25 CAPSULE ORAL at 04:20

## 2022-12-27 RX ADMIN — FAMOTIDINE 20 MG: 20 TABLET, FILM COATED ORAL at 22:25

## 2022-12-27 RX ADMIN — PIPERACILLIN AND TAZOBACTAM 3.38 G: 3; .375 INJECTION, POWDER, FOR SOLUTION INTRAVENOUS at 01:38

## 2022-12-27 RX ADMIN — DIVALPROEX SODIUM 500 MG: 250 TABLET, EXTENDED RELEASE ORAL at 08:36

## 2022-12-27 ASSESSMENT — ACTIVITIES OF DAILY LIVING (ADL)
ADLS_ACUITY_SCORE: 29
ADLS_ACUITY_SCORE: 31
ADLS_ACUITY_SCORE: 29
ADLS_ACUITY_SCORE: 29
ADLS_ACUITY_SCORE: 35
ADLS_ACUITY_SCORE: 31
ADLS_ACUITY_SCORE: 29
ADLS_ACUITY_SCORE: 31
ADLS_ACUITY_SCORE: 35
ADLS_ACUITY_SCORE: 35

## 2022-12-27 NOTE — PROGRESS NOTES
Care Management Follow Up    Length of Stay (days): 2    Expected Discharge Date: 12/29/2022     Concerns to be Addressed: discharge planning     Patient plan of care discussed at interdisciplinary rounds: Yes    Anticipated Discharge Disposition: Skilled Nursing Facility     Anticipated Discharge Services:    Anticipated Discharge DME: Oxygen    Patient/family educated on Medicare website which has current facility and service quality ratings: yes (home care and Rehab)  Education Provided on the Discharge Plan:    Patient/Family in Agreement with the Plan: yes    Referrals Placed by CM/SW: Post Acute Facilities  Private pay costs discussed: private room/amenity fees of $16 a day and $200 a day after 21 days    Additional Information:  Writer spoke with patient and told her she was accepted at [facility] (Admissions: [phone] Main Phone: [phone] Fax: [phone]) Perlita in admissions [phone] writer review private cost and patient is good with the $16 a day and feels she will be discharged before 21 days. Writer notified  Dajuan [phone] of acceptance and he is able to pick patient up at 1100 tomorrow and transport to [facility].  will bring clothes for patient to wear.      Brook Cavazos RN   Inpatient Care Coordinator  [facility] [phone]  [facility] [phone]      Brook Cavazos RN

## 2022-12-27 NOTE — PLAN OF CARE
Goal Outcome Evaluation:      Plan of Care Reviewed With: patient    Overall Patient Progress: no changeOverall Patient Progress: no change    Outcome Evaluation: Pt is intermittently lethargic. At begining of night rated pain to R rib cage 5/10, given Oxycodone. Pt having breakthrough pain currently and received prn Dilaudid. Temps 99.2 (o) and 97.6 (o). Tachycardia with HR low 100s when at rest, 127 when up to the bathroom. Dr. Meesala reviewed UA results and new order fro IV Zosyn. Pt up to the bathroom later in the night and rash noted to back and front trunk. Pt had a sports bra on and thought itching may have been from sports bra being on, was also laying on bed where ice pack had melted and bed was wet. Sports bra removed, notified Dr. Meesala. New order for Benadryl and Nystatin.  Lungs are diminished with crackles in bilateral lower lobes and RML. Edema 2+ to L foot, ankle, 1+ to L leg, R ankle edema 1+. Notified Dr. Meesala regarding lung sounds and edema and current IV fluid rate at 50mL/hr. Pt has voided stephen uirne and bella-pad incontinent as well. No change in IV fluid order. O2 weaned from 1L nc to RA, O2 sats 93%. Using incentive spirometer to 500, has used every two hours. Coughing and deep breathing, cough productive of pale yellow sputum. Poor appetite, though pt ate one piece of toast and almost have of chocolate Ensure.

## 2022-12-27 NOTE — PROGRESS NOTES
Surgery    Subjective:  Patient was awake.   More alert.    Pain is controlled.  Was seen by PT who recommended rehab placement. .  No shortness of breath.  Feels well otherwise.  Fever to 101 last night.  Placed on abx as per hospitalist.  Off O2.  Has NH bed.      Objective:  Vitals:    12/27/22 0538 12/27/22 0737 12/27/22 1200 12/27/22 1526   BP:  (!) 164/81 139/83 (!) 155/85   BP Location:  Right arm Right arm Right arm   Patient Position:  Semi-Silva's     Cuff Size:  Adult Small     Pulse:   108 109   Resp: 20 22 18 18   Temp:  98.2  F (36.8  C) 98.7  F (37.1  C) 98  F (36.7  C)   TempSrc:  Oral Oral Oral   SpO2: 93% 92% 93% 95%   Weight:       Height:         Chest: CTA    Results for orders placed or performed during the hospital encounter of 12/25/22 (from the past 24 hour(s))   Lactic acid whole blood   Result Value Ref Range    Lactic Acid 0.6 (L) 0.7 - 2.0 mmol/L   CBC with platelets   Result Value Ref Range    WBC Count 5.5 4.0 - 11.0 10e3/uL    RBC Count 3.48 (L) 3.80 - 5.20 10e6/uL    Hemoglobin 11.5 (L) 11.7 - 15.7 g/dL    Hematocrit 35.7 35.0 - 47.0 %     (H) 78 - 100 fL    MCH 33.0 26.5 - 33.0 pg    MCHC 32.2 31.5 - 36.5 g/dL    RDW 13.6 10.0 - 15.0 %    Platelet Count 113 (L) 150 - 450 10e3/uL   XR Chest Port 1 View    Narrative    EXAM: XR CHEST PORT 1 VIEW  LOCATION: [facility]  DATE/TIME: 12/26/2022 9:56 PM    INDICATION: Fever. Small right hemopneumothorax and rib fractures.  COMPARISON: CXRs 12/26/2022 6:20 AM and 12/25/2022 and CT chest 12/25/2022.       Impression    IMPRESSION: No visible pneumothorax. Opacification inferior right hemithorax has developed and may represent a combination of increasing pleural fluid and/or thickening and development of consolidation and/or volume loss in the right lower lobe. Left   lung clear. Heart size and pulmonary vascularity within normal limits. Known right rib fractures not visible radiographically.   Glucose  by meter   Result Value Ref Range    GLUCOSE BY METER POCT 84 70 - 99 mg/dL   UA reflex to Microscopic   Result Value Ref Range    Color Urine Ramona (A) Colorless, Straw, Light Yellow, Yellow    Appearance Urine Slightly Cloudy (A) Clear    Glucose Urine Negative Negative mg/dL    Bilirubin Urine Small (A) Negative    Ketones Urine Negative Negative mg/dL    Specific Gravity Urine >=1.030 1.003 - 1.035    Blood Urine Large (A) Negative    pH Urine 5.5 5.0 - 7.0    Protein Albumin Urine 30 (A) Negative mg/dL    Urobilinogen Urine Normal Normal, 2.0 mg/dL    Nitrite Urine Negative Negative    Leukocyte Esterase Urine Negative Negative    Bacteria Urine Moderate (A) None Seen /HPF    RBC Urine >182 (H) <=2 /HPF    WBC Urine 22 (H) <=5 /HPF    Squamous Epithelials Urine <1 <=1 /HPF    Mucus Urine Present (A) None Seen /LPF   Glucose by meter   Result Value Ref Range    GLUCOSE BY METER POCT 100 (H) 70 - 99 mg/dL   Procalcitonin   Result Value Ref Range    Procalcitonin 0.04 <0.05 ng/mL   CBC with platelets   Result Value Ref Range    WBC Count 6.6 4.0 - 11.0 10e3/uL    RBC Count 3.74 (L) 3.80 - 5.20 10e6/uL    Hemoglobin 12.2 11.7 - 15.7 g/dL    Hematocrit 40.4 35.0 - 47.0 %     (H) 78 - 100 fL    MCH 32.6 26.5 - 33.0 pg    MCHC 30.2 (L) 31.5 - 36.5 g/dL    RDW 13.6 10.0 - 15.0 %    Platelet Count 86 (L) 150 - 450 10e3/uL         Assessment/plan:  This is a 62 lady status post a fall onto a railing with right-sided rib fractures and a small hemopneumothorax.  That appears to resolved on today's chest x-ray.  Patient is clinically improved.  She does have generalized weakness and deconditioning which was present prior to her fall.   Had fever last night.  Unclear etiology - lungs vs Urine.  Cont abx.  If continues to improve poss discontinue tomorrow to NH.      Wenceslao Carl MD, FACS

## 2022-12-27 NOTE — PROGRESS NOTES
"[facility]    Medicine Progress Note - Hospitalist Service    Date of Admission:  12/25/2022    Assessment & Plan   Jemima Yuen is a 62 year old female admitted on 12/25/2022 for right sided rib fractures and pneumohemothorax.      Mechanical fall with right sided rib fractures and pneumohemothorax.     Primarily managed by surgical team.      Pain control and monitor respiratory/hemodynamic status per primary surgical team.    Urinary tract infection vs atelectasis    Febrile overnight with resolved with Tylenol.  Patient will require to be afebrile for 24 hours prior to discharge.    Initially started on Zosyn for unknown fever    UA positive for cloudy appearance, blood, moderate bacteria.  Negative for nitrites or leukoesterase.    Started on Rocephin will transition to oral antibiotics x3 days at discharge.    History of seizure.    Per the patient's report, last seizure activity was back in 2005.  The patient has been complaint with taking her Depakote as instructed daily.  The patient denies any recent episode of seizure and states that her fall was simply a \"slip\" on ice.     Resuming home Depakote.         Diet: Advance Diet as Tolerated: Regular Diet Adult    DVT Prophylaxis: Pneumatic Compression Devices  Gregg Catheter: Not present  Central Lines: None  Cardiac Monitoring: None  Code Status: Full Code      Disposition Plan     Expected Discharge Date: 12/29/2022      Destination: nursing home (patient wants home with 24/7 care  unable)          The patient's care was discussed with the Attending Physician, Dr. Perez, Bedside Nurse and Care Coordinator/.    Fer Kim NP  Hospitalist Service  [facility]  Securely message with the Vocera Web Console (learn more here)  Text page via [employer] Paging/Directory         Clinically Significant Risk Factors           # Hypercalcemia: corrected calcium is >10.1, will monitor " as appropriate    # Hypoalbuminemia: Lowest albumin = 2.5 g/dL at 12/26/2022  6:31 AM, will monitor as appropriate   # Thrombocytopenia: Lowest platelets = 86 in last 2 days, will monitor for bleeding                 ______________________________________________________________________    Interval History   12/27 patient was febrile overnight.  UA is suspicious for contamination and chest x-ray shows atelectasis which was likely the cause of fever.  Was initiated on broad-spectrum antibiotics but narrowed to Rocephin for suspected UTI.  Patient is reluctant to participate with physical therapy due to continued pain yet becomes increasingly somnolent with pain medications.  Discontinuing IV analgesics as patient is likely to discharge in a.m.  No additional complaints today.    Data reviewed today: I reviewed all medications, new labs and imaging results over the last 24 hours. I personally reviewed no images or EKG's today.    Physical Exam   Vital Signs: Temp: 98.2  F (36.8  C) Temp src: Oral BP: (!) 164/81 Pulse: 108   Resp: 22 SpO2: 92 % O2 Device: None (Room air) Oxygen Delivery: 1/2 LPM  Weight: 90 lbs 9.6 oz  Constitutional: awake, alert, cooperative, no apparent distress, and appears stated age  ENT: normocepalic, without obvious abnormality, atramatic  Respiratory: no increased work of breathing, no retractions and clear but diminished bilaterally   cardiovascular: normal apical pulses  and normal S1 and S2  GI: normal bowel sounds, non-distended and non-tender  Musculoskeletal: no lower extremity pitting edema present  there is no redness, warmth, or swelling of the joints  Neurologic: Awake, alert, oriented to name, place and time.  Cranial nerves II-XII are grossly intact.  Motor is 5 out of 5 bilaterally.      Data   Recent Labs   Lab 12/27/22  0936 12/27/22  0256 12/26/22  2239 12/26/22  2127 12/26/22  0856 12/26/22  0631 12/25/22  1437   WBC 6.6  --   --  5.5  --  5.9 5.3   HGB 12.2  --   --  11.5*   --  11.8 13.6   *  --   --  103*  --  103* 101*   PLT 86*  --   --  113*  --  119* 153   INR  --   --   --   --   --   --  1.03   NA  --   --   --   --   --  136 137   POTASSIUM  --   --   --   --   --  4.4 4.5   CHLORIDE  --   --   --   --   --  104 102   CO2  --   --   --   --   --  26 29   BUN  --   --   --   --   --  12 20   CR  --   --   --   --   --  0.42* 0.67   ANIONGAP  --   --   --   --   --  6 6   MIMA  --   --   --   --   --  8.4* 9.1   GLC  --  100* 84  --  103* 69* 105*   ALBUMIN  --   --   --   --   --  2.5* 3.1*   PROTTOTAL  --   --   --   --   --  5.4* 6.3*   BILITOTAL  --   --   --   --   --  0.5 0.4   ALKPHOS  --   --   --   --   --  42 53   ALT  --   --   --   --   --  10 9   AST  --   --   --   --   --  17 12     Recent Results (from the past 24 hour(s))   XR Chest Port 1 View    Narrative    EXAM: XR CHEST PORT 1 VIEW  LOCATION: [facility]  DATE/TIME: 12/26/2022 9:56 PM    INDICATION: Fever. Small right hemopneumothorax and rib fractures.  COMPARISON: CXRs 12/26/2022 6:20 AM and 12/25/2022 and CT chest 12/25/2022.       Impression    IMPRESSION: No visible pneumothorax. Opacification inferior right hemithorax has developed and may represent a combination of increasing pleural fluid and/or thickening and development of consolidation and/or volume loss in the right lower lobe. Left   lung clear. Heart size and pulmonary vascularity within normal limits. Known right rib fractures not visible radiographically.     Medications     dextrose 5% and 0.9% NaCl 50 mL/hr at 12/26/22 2050       cefTRIAXone  1 g Intravenous Q24H     divalproex sodium extended-release  500 mg Oral BID     docusate sodium  100 mg Oral BID     famotidine  20 mg Oral BID     miconazole   Topical BID     nicotine  1 patch Transdermal Daily     nicotine   Transdermal Q8H     sodium chloride (PF)  3 mL Intracatheter Q8H

## 2022-12-27 NOTE — PROGRESS NOTES
Nutrition Therapy Update: Brief Note    Writer reviewing chart to follow up on diet advancement - pt has been advanced to a regular diet. Per chart review, has tolerated a piece of toast and half of a chocolate Ensure.     RD attempted to speak with pt regarding overall appetite, intake, conduct NFPE, etc. Pt was not available - will re-attempt as able. Now that pt has advanced diet, will offer Ensure enlive (any flavor) TID with each meal to supplement inadequate oral intake.     Please continue to encourage all PO intake as able. Will follow to monitor overall nutritional supplement tolerance while pt remains admitted.     Nichelle Reyes RD, LD  Clinical Dietitian  Office: [phone]  [facility] pager: [phone]

## 2022-12-27 NOTE — PROVIDER NOTIFICATION
Notified Dr. Meesala: Pt received IV zosyn that was ordered based on UA results. I just had pt up to the bathroom and she has a rash noted to her lower back mostly, but some to the front torso, feeling itchy. New order for Nystatin powder to rash area and Benadryl ordered.

## 2022-12-28 VITALS
OXYGEN SATURATION: 92 % | DIASTOLIC BLOOD PRESSURE: 74 MMHG | TEMPERATURE: 97.4 F | WEIGHT: 90.6 LBS | HEART RATE: 84 BPM | HEIGHT: 57 IN | BODY MASS INDEX: 19.55 KG/M2 | RESPIRATION RATE: 18 BRPM | SYSTOLIC BLOOD PRESSURE: 133 MMHG

## 2022-12-28 PROCEDURE — 250N000011 HC RX IP 250 OP 636: Performed by: NURSE PRACTITIONER

## 2022-12-28 PROCEDURE — 250N000013 HC RX MED GY IP 250 OP 250 PS 637: Performed by: SPECIALIST

## 2022-12-28 PROCEDURE — 250N000013 HC RX MED GY IP 250 OP 250 PS 637: Performed by: EMERGENCY MEDICINE

## 2022-12-28 PROCEDURE — 250N000013 HC RX MED GY IP 250 OP 250 PS 637: Performed by: INTERNAL MEDICINE

## 2022-12-28 PROCEDURE — 99231 SBSQ HOSP IP/OBS SF/LOW 25: CPT | Performed by: SURGERY

## 2022-12-28 PROCEDURE — 99238 HOSP IP/OBS DSCHRG MGMT 30/<: CPT | Performed by: NURSE PRACTITIONER

## 2022-12-28 RX ORDER — OXYCODONE HYDROCHLORIDE 5 MG/1
5 TABLET ORAL EVERY 6 HOURS PRN
Qty: 10 TABLET | Refills: 0 | Status: SHIPPED | OUTPATIENT
Start: 2022-12-28

## 2022-12-28 RX ORDER — CYCLOBENZAPRINE HCL 10 MG
5 TABLET ORAL 3 TIMES DAILY PRN
Qty: 10 TABLET | Refills: 0 | Status: SHIPPED | OUTPATIENT
Start: 2022-12-28

## 2022-12-28 RX ORDER — NICOTINE 21 MG/24HR
1 PATCH, TRANSDERMAL 24 HOURS TRANSDERMAL EVERY 24 HOURS
Qty: 15 PATCH | Refills: 0 | Status: SHIPPED | OUTPATIENT
Start: 2022-12-28

## 2022-12-28 RX ADMIN — FAMOTIDINE 20 MG: 20 TABLET, FILM COATED ORAL at 09:09

## 2022-12-28 RX ADMIN — NICOTINE 1 PATCH: 21 PATCH, EXTENDED RELEASE TRANSDERMAL at 09:09

## 2022-12-28 RX ADMIN — DIVALPROEX SODIUM 500 MG: 250 TABLET, EXTENDED RELEASE ORAL at 09:09

## 2022-12-28 RX ADMIN — CYCLOBENZAPRINE 10 MG: 10 TABLET, FILM COATED ORAL at 03:28

## 2022-12-28 RX ADMIN — CEFTRIAXONE SODIUM 1 G: 1 INJECTION, POWDER, FOR SOLUTION INTRAMUSCULAR; INTRAVENOUS at 10:46

## 2022-12-28 RX ADMIN — ACETAMINOPHEN 650 MG: 325 TABLET, FILM COATED ORAL at 03:29

## 2022-12-28 RX ADMIN — DOCUSATE SODIUM 100 MG: 100 CAPSULE, LIQUID FILLED ORAL at 09:09

## 2022-12-28 ASSESSMENT — ACTIVITIES OF DAILY LIVING (ADL)
ADLS_ACUITY_SCORE: 31

## 2022-12-28 NOTE — PROGRESS NOTES
"General Surgery    S: Doing well. Upset that she isn't going home. Denies pain on current oral regimen. Afebrile for >36hours.     O:  Vital signs:  Temp: 97.4  F (36.3  C) Temp src: Oral BP: 133/74 Pulse: 84   Resp: 18 SpO2: 92 % O2 Device: None (Room air) Oxygen Delivery: 1/2 LPM Height: 144.8 cm (4' 9\") Weight: 41.1 kg (90 lb 9.6 oz)  Estimated body mass index is 19.61 kg/m  as calculated from the following:    Height as of this encounter: 1.448 m (4' 9\").    Weight as of this encounter: 41.1 kg (90 lb 9.6 oz).      Gen: AAOx3, NAD  Heart: RRR  Lungs: CTA using IS  Abd: Soft, non-distended. Non-tender.    Labs/Imaging  No results found for this or any previous visit (from the past 24 hour(s)).    A: s/p fall with traumatic rib fractures with small PTX which has now resolved  Febrile illness - unclear etiology but resolved    P: ok from my perspective to be discharged to NH    "

## 2022-12-28 NOTE — DISCHARGE SUMMARY
[facility]  Hospitalist Discharge Summary      Date of Admission:  12/25/2022  Date of Discharge:  12/28/2022  Discharging Provider: Fer Kim NP  Discharge Service: Hospitalist Service    Discharge Diagnoses   Right-sided rib fractures and pneumohemothorax secondary to mechanical fall with additional UTI    Follow-ups Needed After Discharge   Follow-up Appointments     Follow Up and recommended labs and tests      Follow up with Nursing home physician.  No follow up labs or test are   needed.             Unresulted Labs Ordered in the Past 30 Days of this Admission     Date and Time Order Name Status Description    12/26/2022  9:07 PM Blood Culture Arm, Right Preliminary     12/26/2022  9:07 PM Blood Culture Arm, Left Preliminary       These results will be followed up by primary care provider    Discharge Disposition   Discharged to short-term care facility  Condition at discharge: Stable      Hospital Course   Jemima Yuen is a 62 year old female admitted on 12/25/2022 for right sided rib fractures and pneumohemothorax both of which were treated with medical management and patient was cleared by physical therapy to go to TCU for strength and mobility training.  Hospital course laid out below:    Mechanical fall with right sided rib fractures and pneumohemothorax.     Primarily managed by surgical team.      Pain control and monitor respiratory/hemodynamic status per primary surgical team.    Follow with physical therapy and Occupational Therapy at skilled nursing facility.    Urinary tract infection vs atelectasis    Febrile overnight with resolved with Tylenol.  Patient will require to be afebrile for 24 hours prior to discharge.    Initially started on Zosyn for unknown fever    UA positive for cloudy appearance, blood, moderate bacteria.  Negative for nitrites or leukoesterase.    Started on Rocephin while inpatient and received total of 3 days worth of antibiotics prior  "to discharge.    History of seizure.    Per the patient's report, last seizure activity was back in 2005.  The patient has been complaint with taking her Depakote as instructed daily.  The patient denies any recent episode of seizure and states that her fall was simply a \"slip\" on ice.     Resuming home Depakote.        Consultations This Hospital Stay   HOSPITALIST IP CONSULT  PHYSICAL THERAPY ADULT IP CONSULT  CARE MANAGEMENT / SOCIAL WORK IP CONSULT  NUTRITION SERVICES ADULT IP CONSULT  PHYSICAL THERAPY ADULT IP CONSULT  OCCUPATIONAL THERAPY ADULT IP CONSULT    Code Status   Full Code    Time Spent on this Encounter   I, Fer Kim NP, personally saw the patient today and spent greater than 30 minutes discharging this patient.       Fer Kim NP  [address] SURGICAL  [address] [place] [address]  Phone: [phone]  ______________________________________________________________________    Physical Exam   Vital Signs: Temp: 97.4  F (36.3  C) Temp src: Oral BP: 133/74 Pulse: 84   Resp: 18 SpO2: 92 % O2 Device: None (Room air)    Weight: 90 lbs 9.6 oz  Constitutional: awake, alert, cooperative, no apparent distress, and appears stated age  ENT: normocepalic, without obvious abnormality, atramatic  Respiratory: no increased work of breathing, no retractions and clear to auscultation  Cardiovascular: normal apical pulses  and normal S1 and S2  GI: normal bowel sounds, non-distended and non-tender  Musculoskeletal: motor strength is 4 out of 5 all extremities bilaterally  tone is normal  Neurologic: Awake, alert, oriented to name, place and time.  Cranial nerves II-XII are grossly intact.  Motor is 5 out of 5 bilaterally.  \       Primary Care Physician   [facility]    Discharge Orders      General info for SNF    Length of Stay Estimate: Short Term Care: Estimated # of Days <30  Condition at Discharge: Improving  Level of care:skilled   Rehabilitation " Potential: Good  Admission H&P remains valid and up-to-date: Yes  Recent Chemotherapy: N/A  Use Nursing Home Standing Orders: Yes     Mantoux instructions    Give two-step Mantoux (PPD) Per Facility Policy Yes     Follow Up and recommended labs and tests    Follow up with Nursing home physician.  No follow up labs or test are needed.     Reason for your hospital stay    Right-sided rib fractures with pneumohemothorax secondary to mechanical fall and UTI     Activity - Up ad talya     Physical Therapy Adult Consult    Evaluate and treat as clinically indicated.    Reason: Impaired mobility     Occupational Therapy Adult Consult    Evaluate and treat as clinically indicated.    Reason: Impaired mobility for ADLs     Fall precautions     Diet    Follow this diet upon discharge: Orders Placed This Encounter      Snacks/Supplements Adult: Ensure Enlive; With Meals      Advance Diet as Tolerated: Regular Diet Adult       Significant Results and Procedures   Results for orders placed or performed during the hospital encounter of 12/25/22   CT CHEST W CONTRAST    Narrative    EXAM: CT CHEST W CONTRAST  LOCATION: [facility]  DATE/TIME: 12/25/2022 3:38 PM    INDICATION: Fall with right posterior rib pain.  COMPARISON: Chest x-ray on 04/09/2022.  TECHNIQUE: CT chest with IV contrast. Multiplanar reformats were obtained. Dose reduction techniques were used.    CONTRAST: 50mL, Isovue 370    FINDINGS:   LUNGS AND PLEURA: Small right pneumothorax, small layering hemothorax in the right lung base and mild right basilar atelectasis. Mild emphysema. No left pneumothorax or pleural effusion. A 2 mm right upper lobe nodule (4/70).    MEDIASTINUM/AXILLAE: No lymphadenopathy. No pulmonary emboli. No thoracic aortic aneurysm or thoracic aortic injury. No pericardial effusion.    CORONARY ARTERY CALCIFICATION: Mild.    UPPER ABDOMEN: Few small hepatic cysts. Cholecystectomy. Renal cysts  bilaterally.    MUSCULOSKELETAL: Acute displaced and mildly comminuted fractures of the posterior right 9th and 10th ribs. No other displaced fractures.      Impression    IMPRESSION:   1.  Acute displaced and mildly comminuted fractures of the right posterior 9th and 10th ribs. Associated small right-sided pneumothorax and hemothorax.  2.  Mild emphysema.  3.  Right upper lobe 2 mm nodule. See follow-up guidelines.    REFERENCE:  Guidelines for Management of Incidental Pulmonary Nodules Detected on CT Images: From the Fleischner Society 2017.   Guidelines apply to incidental nodules in patients who are 35 years or older.  Guidelines do not apply to lung cancer screening, patients with immunosuppression, or patients with known primary cancer.    SINGLE NODULE  Nodule size <6 mm  Low-risk patients: No follow-up needed.  High-risk patients: Optional follow-up at 12 months.     XR Chest Port 1 View    Narrative    EXAM: XR CHEST PORT 1 VIEW  LOCATION: [facility]  DATE/TIME: 12/25/2022 4:40 PM    INDICATION: Rib fracture with pneumothorax  COMPARISON: CT performed the same day      Impression    IMPRESSION: Heart normal in size. Right apical pneumothorax better visualized on CT performed the same day. Fractures of the right ninth and 10th ribs are seen on CT. Lungs are clear.   US Renal Complete Non-Vascular    Narrative    EXAM: US RENAL COMPLETE NON-VASCULAR  LOCATION: [facility]  DATE/TIME: 12/25/2022 6:16 PM    INDICATION: Right posterior rib fractures with hematuria  COMPARISON: Ultrasound 7/29/2019, CT 8/9/2021  TECHNIQUE: Routine Bilateral Renal and Bladder Ultrasound.    FINDINGS:    RIGHT KIDNEY: 8.1 x 4.3 x 3.9 cm. Diffusely echogenic renal parenchyma consistent with medical renal disease. No hydronephrosis. No renal injury or perinephric fluid collection evident.     LEFT KIDNEY: 8.5 x 3.7 x 3.5 cm. No hydronephrosis. Diffusely echogenic renal  parenchyma. 2.1 cm simple lower pole cyst    BLADDER: Normal.      Impression    IMPRESSION:  1.  Mildly small kidneys with prominent increased echogenicity of parenchyma most suggestive of medical renal disease. No hydronephrosis. No acute abnormality evident.   XR Chest Port 1 View    Narrative    EXAM: XR CHEST PORT 1 VIEW  LOCATION: [facility]  DATE/TIME: 12/25/2022 10:16 PM    INDICATION: f u apical pneumothorax  COMPARISON: Earlier today at 1621 hours.      Impression    IMPRESSION: No visible pneumothorax. Increased atelectasis at right lung base. Lungs otherwise clear. Heart size normal.   XR Chest Port 1 View    Narrative    EXAM: XR CHEST PORT 1 VIEW  LOCATION: [facility]  DATE/TIME: 12/26/2022 6:20 AM    INDICATION: Pt with small PTX  COMPARISON: 12/25/2022      Impression    IMPRESSION: The previously seen left apical pneumothorax is not appreciated on the current study. Basilar atelectasis is again noted, unchanged   XR Chest Port 1 View    Narrative    EXAM: XR CHEST PORT 1 VIEW  LOCATION: [facility]  DATE/TIME: 12/26/2022 9:56 PM    INDICATION: Fever. Small right hemopneumothorax and rib fractures.  COMPARISON: CXRs 12/26/2022 6:20 AM and 12/25/2022 and CT chest 12/25/2022.       Impression    IMPRESSION: No visible pneumothorax. Opacification inferior right hemithorax has developed and may represent a combination of increasing pleural fluid and/or thickening and development of consolidation and/or volume loss in the right lower lobe. Left   lung clear. Heart size and pulmonary vascularity within normal limits. Known right rib fractures not visible radiographically.       Discharge Medications   Current Discharge Medication List      START taking these medications    Details   cyclobenzaprine (FLEXERIL) 10 MG tablet Take 0.5 tablets (5 mg) by mouth 3 times daily as needed for muscle spasms  Qty: 10 tablet,  Refills: 0    Associated Diagnoses: Closed fracture of multiple ribs of right side, initial encounter      nicotine (NICODERM CQ) 21 MG/24HR 24 hr patch Place 1 patch onto the skin every 24 hours  Qty: 15 patch, Refills: 0    Associated Diagnoses: Tobacco abuse      oxyCODONE (ROXICODONE) 5 MG tablet Take 1 tablet (5 mg) by mouth every 6 hours as needed for severe pain (7-10) (IF pain not managed with non-pharmacological and non-opioid interventions)  Qty: 10 tablet, Refills: 0    Associated Diagnoses: Closed fracture of multiple ribs of right side, initial encounter         CONTINUE these medications which have NOT CHANGED    Details   albuterol (PROVENTIL) (2.5 MG/3ML) 0.083% neb solution Inhale 2.5 mg into the lungs every 4 hours as needed for cough, shortness of breath or wheezing      alendronate (FOSAMAX) 70 MG tablet Take 70 mg by mouth once a week Mondays      divalproex sodium extended-release (DEPAKOTE ER) 500 MG 24 hr tablet Take 500 mg by mouth 2 times daily      loratadine (CLARITIN) 10 MG tablet Take 1 tablet (10 mg) by mouth daily as needed (itch/allergic reaction)  Qty: 10 tablet, Refills: 0           Allergies   Allergies   Allergen Reactions     Codeine Itching     Percocet [Oxycodone-Acetaminophen] GI Disturbance     Vicodin [Hydrocodone-Acetaminophen] GI Disturbance

## 2022-12-28 NOTE — PROGRESS NOTES
S-(situation): Patient discharged to [facility] via Car with     B-(background): Pt admitted 12/25 with fall and rib pain.     A-(assessment): Pt alert and orientated x4, pain 5/10 in ribs, prn oxy, flexeril, and tylenol given. VSS., fine crackles heard throughout.   Last bowel movement: 12/25    R-(recommendations):Report called to [facility]. Listed belongings gathered and sent with patient.     Discharge Nursing Criteria:     Care Plan and Patient education resolved: Yes    Vaccines  Influenza status verified at discharge:  Yes      MISC  Home medications returned to patient: Yes  Medication Bin checked and emptied on discharge Yes  All paperwork sent with patient/Copy of AVS given to patient or family Yes.

## 2022-12-28 NOTE — PROGRESS NOTES
Jemima AVILES [facility]  Gender: female  : 1960  [address]  [facility] [place] [facility] [address]  [phone] (home)     Medical Record: [id number]  Pharmacy:    [facility]  [place] - [address] [facility]  [facility] [place] [place] - [address] [place] [address] [address]  Primary Care Provider: Adama, [facility]    Parent's names are: Data Unavailable (mother) and Data Unavailable (father).      [facility]  2022     Discharge Phone Call:  Pt discharged to [facility]

## 2022-12-28 NOTE — PLAN OF CARE
Goal Outcome Evaluation:      Plan of Care Reviewed With: patient    Overall Patient Progress: no changeOverall Patient Progress: no change    Outcome Evaluation: PRN Oxycodone, Tylenol and Flexeril with somewhat relief, continues to have guarding on right side rib area. Up with SBA to bedside commode. Continent of urine.

## 2022-12-28 NOTE — PROGRESS NOTES
Care Management Discharge Note    Discharge Date: 12/28/2022       Discharge Disposition: Skilled Nursing Facility ([facility])    Discharge Services:      Discharge DME: Oxygen    Discharge Transportation: health plan transportation    Private pay costs discussed: private room/amenity fees    PAS Confirmation Code: [id number]  Patient/family educated on Medicare website which has current facility and service quality ratings: yes (home care and Rehab)    Education Provided on the Discharge Plan:    Persons Notified of Discharge Plans: patient and  Dajuan  Patient/Family in Agreement with the Plan: yes    Handoff Referral Completed: Yes    Additional Information:  Patient will discharge to [facility] (Admissions: [phone] Main Phone: [phone] Fax: [phone])  will transport.   Brook Cavazos RN   Inpatient Care Coordinator  [facility] [phone]  [facility] [phone]        Brook Cavazos RN

## 2022-12-28 NOTE — PROGRESS NOTES
Physical Therapy Discharge Summary    Reason for therapy discharge:    Discharged to transitional care facility.    Progress towards therapy goal(s). See goals on Care Plan in [facility] electronic health record for goal details.  Goals partially met.  Barriers to achieving goals:   discharge from facility.    Therapy recommendation(s):    Continued therapy is recommended.  Rationale/Recommendations:  Strength and activity tolerance..

## 2022-12-28 NOTE — PLAN OF CARE
Goal Outcome Evaluation:      Plan of Care Reviewed With: patient    Overall Patient Progress: improvingOverall Patient Progress: improving    Outcome Evaluation: Pt alert and orientated x4, up with SBA walker and gait belt to bathroom, calls appropriately, incontinence of urine chronic for pt, PRN oxycodone and flexeril given for pain, IV rocephin tolerated today. VSS

## 2022-12-29 ENCOUNTER — LAB REQUISITION (OUTPATIENT)
Dept: LAB | Facility: CLINIC | Age: 62
End: 2022-12-29
Payer: MEDICARE

## 2022-12-29 DIAGNOSIS — R76.12 NONSPECIFIC REACTION TO CELL MEDIATED IMMUNITY MEASUREMENT OF GAMMA INTERFERON ANTIGEN RESPONSE WITHOUT ACTIVE TUBERCULOSIS: ICD-10-CM

## 2022-12-30 PROCEDURE — 86481 TB AG RESPONSE T-CELL SUSP: CPT | Performed by: FAMILY MEDICINE

## 2022-12-30 PROCEDURE — 36415 COLL VENOUS BLD VENIPUNCTURE: CPT | Performed by: FAMILY MEDICINE

## 2022-12-30 PROCEDURE — P9603 ONE-WAY ALLOW PRORATED MILES: HCPCS | Performed by: FAMILY MEDICINE

## 2023-01-01 ENCOUNTER — HEALTH MAINTENANCE LETTER (OUTPATIENT)
Age: 63
End: 2023-01-01

## 2023-01-01 LAB
BACTERIA BLD CULT: NO GROWTH
BACTERIA BLD CULT: NO GROWTH
GAMMA INTERFERON BACKGROUND BLD IA-ACNC: 0.07 IU/ML
M TB IFN-G BLD-IMP: NEGATIVE
M TB IFN-G CD4+ BCKGRND COR BLD-ACNC: 3.65 IU/ML
MITOGEN IGNF BCKGRD COR BLD-ACNC: 0 IU/ML
MITOGEN IGNF BCKGRD COR BLD-ACNC: 0 IU/ML
QUANTIFERON MITOGEN: 3.72 IU/ML
QUANTIFERON NIL TUBE: 0.07 IU/ML
QUANTIFERON TB1 TUBE: 0.07 IU/ML
QUANTIFERON TB2 TUBE: 0.07

## 2023-01-03 ENCOUNTER — LAB REQUISITION (OUTPATIENT)
Dept: LAB | Facility: CLINIC | Age: 63
End: 2023-01-03
Payer: MEDICARE

## 2023-01-03 DIAGNOSIS — G40.501 EPILEPTIC SEIZURES RELATED TO EXTERNAL CAUSES, NOT INTRACTABLE, WITH STATUS EPILEPTICUS (H): ICD-10-CM

## 2023-01-03 DIAGNOSIS — E03.9 HYPOTHYROIDISM, UNSPECIFIED: ICD-10-CM

## 2023-01-04 LAB
T4 FREE SERPL-MCNC: 0.89 NG/DL (ref 0.9–1.7)
TSH SERPL DL<=0.005 MIU/L-ACNC: 7.95 UIU/ML (ref 0.3–4.2)
VALPROATE SERPL-MCNC: 113 UG/ML

## 2023-01-04 PROCEDURE — 80164 ASSAY DIPROPYLACETIC ACD TOT: CPT | Performed by: FAMILY MEDICINE

## 2023-01-04 PROCEDURE — P9603 ONE-WAY ALLOW PRORATED MILES: HCPCS | Performed by: FAMILY MEDICINE

## 2023-01-04 PROCEDURE — 36415 COLL VENOUS BLD VENIPUNCTURE: CPT | Performed by: FAMILY MEDICINE

## 2023-01-04 PROCEDURE — 84439 ASSAY OF FREE THYROXINE: CPT | Performed by: FAMILY MEDICINE

## 2023-01-04 PROCEDURE — 84443 ASSAY THYROID STIM HORMONE: CPT | Performed by: FAMILY MEDICINE

## 2023-01-19 ENCOUNTER — LAB REQUISITION (OUTPATIENT)
Dept: LAB | Facility: CLINIC | Age: 63
End: 2023-01-19
Payer: MEDICARE

## 2023-01-19 DIAGNOSIS — R19.7 DIARRHEA, UNSPECIFIED: ICD-10-CM

## 2023-01-19 PROCEDURE — 87798 DETECT AGENT NOS DNA AMP: CPT | Performed by: FAMILY MEDICINE

## 2023-01-20 LAB — NOROV GI+II ORF1-ORF2 JNC STL QL NAA+PR: NOT DETECTED

## 2024-01-01 ENCOUNTER — TRANSFERRED RECORDS (OUTPATIENT)
Dept: HEALTH INFORMATION MANAGEMENT | Facility: CLINIC | Age: 64
End: 2024-01-01
Payer: MEDICARE